# Patient Record
Sex: FEMALE | Race: BLACK OR AFRICAN AMERICAN | Employment: FULL TIME | URBAN - METROPOLITAN AREA
[De-identification: names, ages, dates, MRNs, and addresses within clinical notes are randomized per-mention and may not be internally consistent; named-entity substitution may affect disease eponyms.]

---

## 2021-04-22 ENCOUNTER — OFFICE VISIT (OUTPATIENT)
Dept: ORTHOPEDIC SURGERY | Age: 26
End: 2021-04-22
Payer: OTHER GOVERNMENT

## 2021-04-22 VITALS
TEMPERATURE: 97.8 F | WEIGHT: 138.2 LBS | HEART RATE: 88 BPM | OXYGEN SATURATION: 100 % | HEIGHT: 62 IN | BODY MASS INDEX: 25.43 KG/M2

## 2021-04-22 DIAGNOSIS — M41.125 ADOLESCENT IDIOPATHIC SCOLIOSIS OF THORACOLUMBAR REGION: Primary | ICD-10-CM

## 2021-04-22 DIAGNOSIS — M41.125 ADOLESCENT IDIOPATHIC SCOLIOSIS OF THORACOLUMBAR REGION: ICD-10-CM

## 2021-04-22 DIAGNOSIS — G89.29 CHRONIC BILATERAL LOW BACK PAIN WITHOUT SCIATICA: ICD-10-CM

## 2021-04-22 DIAGNOSIS — M54.50 CHRONIC BILATERAL LOW BACK PAIN WITHOUT SCIATICA: ICD-10-CM

## 2021-04-22 PROCEDURE — 99203 OFFICE O/P NEW LOW 30 MIN: CPT | Performed by: PHYSICAL MEDICINE & REHABILITATION

## 2021-04-22 NOTE — PROGRESS NOTES
[address]  [address], [address]  [address]  Phone: [phone]  Fax: [phone]      Patient: Katelyn Dale                                                                              MRN: [id number]        YOB: 1995          AGE: 23 y.o. PCP: Jairo Stearns MD  Date:  04/22/21    Reason for Consultation: Back Pain      HPI:  Katelyn Dale is a 23 y.o. female with relevant PMH of scoliosis dx at age 9, tried bracing and then ultimately had surgery (?[place] iqra surgery) at age 16 in [facility], post surgery was active ran cross country and did gymanstic. With her first pregnancy about 1.5  years ago she had right sided low back pain radiating into her right leg that improved once she delivered. She became pregnant with her second son shortly after and during this pregnancy had severe low back pain and right thoracic/scapular pain. She also noticed numbness in her right scapula. After delivery 3 months ago the numbness improved but she continues to have constant low back pain and intermittent right thoracic pain. Neurologic symptoms: No numbness, tingling, weakness, bowel or bladder changes. No recent falls      Location: The pain is located in the constant low back, intermitent right upper and scapular pain. Radiation: The pain does not radiate   Pain Score: Currently: 4/10  At Best: 3/10  At Worst: 10/10   Quality: Pain is of a Achy quality. Aggravating: Pain is exacerbated by bending forward  Alleviating:  The pain is alleviated by heat    Prior Treatments:   Physical therapy- 2013  Previous Medications:  Lidocaine patch  Current Medications:  Previous work-up has included:   No x-rays available today      Past Medical History:   Past Medical History:   Diagnosis Date    Eczema     Scoliosis       Past Surgical History:   Past Surgical History:   Procedure Laterality Date    HX WISDOM TEETH EXTRACTION      [place] SPINE SURGERY PROCEDURE UNLISTED      for scoliosis      SocHx:   Social History     Tobacco Use    Smoking status: Current Every Day Smoker     Types: Cigarettes    Smokeless tobacco: Never Used   Substance Use Topics    Alcohol use: Yes     Comment: socially      FamHx:? No family history on file. Current Medications: Allergies: Allergies   Allergen Reactions    Banana Other (comments)     Stomach ache    Peanut Butter Flavor Itching and Swelling        Review of Systems:   Gen:    Denied fevers, chills, malaise, fatigue, weight changes   Resp: Denied shortness of breath, cough, wheezing   CVS: Denied chest pain, palpitations   : Denied urinary urgency, frequency, incontinence   GI: Denied nausea, vomiting, constipation, diarrhea   Skin: Denied rashes, wounds   Psych: Denied anxiety, depression   Vasc: Denied claudication, ulcers   Hem: Denied easy bruising/bleeding   MSK: See HPI   Neuro: See HPI         Physical Exam     Vital Signs:   Visit Vitals  Pulse 88   Temp 97.8 °F (36.6 °C) (Tympanic)   Ht 5' 2\" (1.575 m)   Wt 138 lb 3.2 oz (62.7 kg)   SpO2 100%   BMI 25.28 kg/m²      General: ??????? Well nourished and well developed female without any acute distress   Psychiatric: ?  Alert and oriented x 3 with normal mood    HEENT: ???????? Atraumatic   Respiratory:   Breathing non-labored and non dyspneic   CV: ???????????????? Peripheral pulses intact, no peripheral edema   Skin: ????????????? No rashes + scar along thoracic and lumbar spineT4-L3      Neurologic: ?? Sensation: normal and grossly intact thebilateral, upper extremity(s), lower extremity(s)   Strength: 5/5 in the bilateral, upper extremity(s), lower extremity(s)  Reflexes: reveals 2+ symmetric DTRs throughout UE   Gait: normal   Upper tract signs: Sin's negative ?      Musculoskeletal: Lumbar Exam     Inspection:   Alignment: Abnormal + right thoracic prominence  Atrophy: None   Single leg stance: Abnormal increased pelvic tilt when standing right leg      Tenderness to Palpation:   Lumbar paraspinals Positive R>L  Lumbar spinous processes Negative  SI Joint:  Positive right  Gluteal:Negative   Greater trochanter: Negative   Tenderness along the right thoracic paraspinals and medial scapula,    ROM:   Lumbar ROM: Abnormal decreased lumbar motion with flexion  Lumbar facet loading: Positive  Hip ROM: No reproduction of pain with movement     Special Tests      Slump test: Negative  SLR: Negative  CAMILLA: Negative  FADIR: Negative       Assessment:   1. Thoracolumbar scoliosis s/p surgery at age 16- T4-L4? 2. Lumbar pain below iqra surgery  3. ? Right Thoracic neuritis  4.? Lumbar radiculitis with weakness in RLE    Plan:      -Physical therapy -  To work on core strengthening, strengthening   -Medications - motrin prn. Counseled regarding side effects and appropriate administration of medications.    -Diagnostics/Imaging -scoliosis series, lumbar spine x-ray to evaluate degenerative changes distal to surgery  -Injections NA . Consider lumbar AMBREEN or facet injections, possible scapulothoracic bursa injection    -Lifestyle - continue regular aerobic activity and maintain healthy weight  -Education - The patient's diagnosis, prognosis and treatment options were discussed today. All questions were answered. F/U - in 6 week(s) or sooner if needed. Consider further imaging lumbar spine. Will review x-rays look at degenerative changes lower lumbar spine.          [address] [facility]

## 2021-04-28 ENCOUNTER — HOSPITAL ENCOUNTER (OUTPATIENT)
Dept: PHYSICAL THERAPY | Age: 26
Discharge: HOME | End: 2021-04-28
Payer: OTHER GOVERNMENT

## 2021-04-28 PROCEDURE — 97162 PT EVAL MOD COMPLEX 30 MIN: CPT

## 2021-04-28 PROCEDURE — 97530 THERAPEUTIC ACTIVITIES: CPT

## 2021-04-28 NOTE — PROGRESS NOTES
PT DAILY TREATMENT NOTE    Patient Name: Francois Chawla  Date:2021  : 1995  [x]  Patient  Verified  Payor:  / Plan: Severiano Pond / Product Type:  /    In time: 9:45 am  Out time:10:25 am  Total Treatment Time (min): 40  Total Timed Codes (min): 15  1:1 Treatment Time ( [address] only): 40   Visit #: 1 of 12    Treatment Area: Low back pain [M54.5]    SUBJECTIVE  Pain Level (0-10 scale): 3  Any medication changes, allergies to medications, adverse drug reactions, diagnosis change, or new procedure performed?: [x] No    [] Yes (see summary sheet for update)  Subjective functional status/changes:   [] No changes reported    Hx Present Illness: C/C of LBP, \"my lower back feels like it is about to break. 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   Denies numbness and tingling, no radicular sx or bowel and bladder involvement   Per pt MD feels has weakness on right side so has imaging pending   Had severe childhood scoliosis - rid placed in 2009 - Thoracic spine (level unknown) to S1  Increase in pain with bending, lifting children or heavy objects, can disturb sleep (1-2 times per week)  Relieving: heat  Just stopped breast feeding      Pain:  _10__/10 max       __3_/10 min     _3___/10 now    Location: Reports pain across low back, up right side to right axilla       [] Sharp    [] Dull      [] Burning     [x]  Aching     [] Throbbing      [] Tingling     [x] Other: Stiff        [x]  Constant                   [] Intermittent        Previous treatment:   PT in high school for back pain  Had PT during second pregnancy for pelvic pain     PMHX: PMHx/Surgical Hx:  significant childhood scoliosis - with Pilo placement     Social/Recreation/Work: Work Hx: in school for ultrasound tech, 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 \"    Cognition: A & O x 4      OBJECTIVE    25 min [x]Eval                  []Re-Eval             With   [] TE   [x] TA - 15 min    [] neuro   [] other: Patient Education: [x] Review HEP    [] Progressed/Changed HEP based on:   [] positioning   [] body mechanics   [] transfers   [] heat/ice application    [x] other: pt education regarding exam findings, anatomy involved, POC, pelvic floor function, discussed iqra placed     Other Objective/Functional Measures: Movement/gait:  Left shoulder lower, increased lateral foot strike, increased pelvic drop decreased arm swing     Visual Inspection: Thoracic: flattened   Lumbar: flattened   Shoulder/Scapula: right shoulder and scap higher, bilateral scap adducted   Incisions: well healed incision from T2-3 to L5    Palpation: TTP at bilateral posterior hip Left > Right                            AROM/PROM Right Left   Standing Flexion: to floor     Extension: dcr less than 25%     Trunk Rot 17 in p! 16.5 in         Hip IR 20 24   ER 18 16     Strength Right Left   Hip Flex 4 4   Knee Ext 5 5   Hamstrings  4- 4-   Hip ABD 3- 3+   DF WFL WFL   PF WFL WFL   Bridging: pain with bridging on right side, decreased with PPT    Special Tests Right Left   Slump - -   SLR - -   Passive SLR 90 90   Hip Add Drop - +   Gaenslen's - -   CAMILLA  - -   Had hip pain bilaterally with Gaenslen's    Reflexes Right Left   L4 2+ 2+   S1 2+ 2+                             Other Comments: Standing Forward Flexion fingers to floor, n reversal of lordosis (iqra), pain with standing, right rib hump  Gillet: hypomobile and dysrhythmia bilaterally     Pain Level (0-10 scale) post treatment: 3    ASSESSMENT/Changes in Function:   Pt is a 23 y.o. female with C/C of LBP, but denies radicular sx at this time.  Pt denies red flags and bowel and bladder involvement. Pt has pronounced hx of childhood scoliosis with iqra placed from ~T2-S1 in 2009. Pt has hx of childhood back pain. In most recent instance pain onset with pregnancy of 1rst child and has progressively increased since pregnancy of second and birth 3 months ago. At present signs/symptoms consistent with mechanical LBP and lumbopelvic dysfunction. Objective findings include noted postural adaptations, gait dysfunctions. Poor trunk stability with movements, pain with bridging, decreased trunk and glut strength and glut and hamstring inhibition as well as soft tissue restrictions. Patient will continue to benefit from skilled PT services to modify and progress therapeutic interventions, address functional mobility deficits, address ROM deficits, address strength deficits, analyze and address soft tissue restrictions, analyze and cue movement patterns, analyze and modify body mechanics/ergonomics, assess and modify postural abnormalities and instruct in home and community integration to attain remaining goals. [x]  See Plan of Care  []  See progress note/recertification  []  See Discharge Summary         Progress towards goals / Updated goals:  Short Term Goals: STG- To be accomplished in 5 treatment(s):  1. Pt will be independent with HEP to encourage prophylaxis. Eval:dispensed, to be updated as pt tolerates     Long Term Goals: LTG- To be accomplished in 12 treatments (s):  1. Pt will demonstrate ability bridge >10 times to full height without pain to indicate functional glut and hamstring strength. Eval:Bridging: pain with bridging on right side        2. Pt will be able to forward flex and pick object up off of ground without pain to increase tolerance to daily activities. Eval: Standing Forward Flexion fingers to floor, no reversal of lordosis (iqra), pain with standing, right rib hump    3.   Pt will improve trunk rotation to 15 in in hooklying without pain to indicate mobility needed for gait   Eval:Right: 17.5 in p!, Left: 16.5 in    4. Pt FOTO score will increase by 10 points to show improvement in function. Eval:60  Current: will address at visit 5    5. Pt will be able to lift children without pain to allow pt to perform daily activities.    Eval: pain with lifting and holding child (30 pounds.)        PLAN  [x]  Upgrade activities as tolerated     []  Continue plan of care  []  Update interventions per flow sheet       []  Discharge due to:_  []  Other:_      Linda Slater, PT, DPT 4/28/2021  9:49 AM    Future Appointments   Date Time Provider Imelda Gonzalez   6/17/2021  9:15 AM Cullen Rogel MD [facility]

## 2021-04-28 NOTE — PROGRESS NOTES
[facility] at the [address], [place], [address] [facility]  Phone: [phone]      Fax:  [phone]       Plan of Care/ Statement of Necessity for Physical Therapy Services      Patient name: Shaye Valdez Start of Care: 2021   Referral source: Rip Arabia* : 1995    Medical Diagnosis: Low back pain [M54.5]   Onset Date:2 years ago    Treatment Diagnosis: LBP   Prior Hospitalization: see medical history Provider#: [id number]   Medications: Verified on Patient summary List    Comorbidities: Severe Childhood scoliosis, Pilo placement T2-L5, hx of LBP, allergies    Prior Level of Function: Increase in pain with bending, lifting children or heavy objects, can disturb sleep (1-2 times per week)      The Plan of Care and following information is based on the information from the initial evaluation. Assessment/ key information:   Pt is a 23 y.o. female with C/C of LBP, but denies radicular sx at this time. Pt denies red flags and bowel and bladder involvement. Pt has pronounced hx of childhood scoliosis with pilo placed from ~T2-S1 in . Pt has hx of childhood back pain. In most recent instance pain onset with pregnancy of 1rst child and has progressively increased since pregnancy of second and birth 3 months ago. At present signs/symptoms consistent with mechanical LBP and lumbopelvic dysfunction. Objective findings include noted postural adaptations, gait dysfunctions. Poor trunk stability with movements, pain with bridging, decreased trunk and glut strength and glut and hamstring inhibition as well as soft tissue restrictions.      Evaluation Complexity History HIGH Complexity :3+ comorbidities / personal factors will impact the outcome/ POC ; Examination HIGH Complexity : 4+ Standardized tests and measures addressing body structure, function, activity limitation and / or participation in recreation  ;Presentation MEDIUM Complexity : Evolving with changing characteristics  ; Clinical Decision Making MEDIUM Complexity : FOTO score of 26-74  Overall Complexity Rating: MEDIUM  Problem List: pain affecting function, decrease ROM, decrease strength, impaired gait/ balance, decrease ADL/ functional abilitiies, decrease activity tolerance and decrease flexibility/ joint mobility   Treatment Plan may include any combination of the following: Therapeutic exercise, Therapeutic activities, Neuromuscular re-education, Physical agent/modality, Gait/balance training, Manual therapy and Patient education  Patient / Family readiness to learn indicated by: asking questions, trying to perform skills and interest  Persons(s) to be included in education: patient (P)  Barriers to Learning/Limitations: None  Patient Goal (s): Strengthen my right side and pain relief.   Patient Self Reported Health Status: good  Rehabilitation Potential: good    Short Term Goals: STG- To be accomplished in 5 treatment(s):  1. Pt will be independent with HEP to encourage prophylaxis. Eval:dispensed, to be updated as pt tolerates      Long Term Goals: LTG- To be accomplished in 12 treatments (s):  1. Pt will demonstrate ability bridge >10 times to full height without pain to indicate functional glut and hamstring strength. Eval:Bridging: pain with bridging on right side         2. Pt will be able to forward flex and pick object up off of ground without pain to increase tolerance to daily activities. Eval: Standing Forward Flexion fingers to floor, no reversal of lordosis (iqra), pain with standing, right rib hump     3. Pt will improve trunk rotation to 15 in in hooklying without pain to indicate mobility needed for gait   Eval:Right: 17.5 in p!, Left: 16.5 in     4. Pt FOTO score will increase by 10 points to show improvement in function. Eval:60  Current: will address at visit 5     5. Pt will be able to lift children without pain to allow pt to perform daily activities.    Eval: pain with lifting and holding child (30 pounds.)     Frequency / Duration: Patient to be seen 12 treatments. Patient/ Caregiver education and instruction: Diagnosis, prognosis, self care, activity modification and exercises   [x]  Plan of care has been reviewed with ELENI Armstrong PT, DPT 4/28/2021 3:16 PM  _____________________________________________________________________  I certify that the above Therapy Services are being furnished while the patient is under my care. I agree with the treatment plan and certify that this therapy is necessary.     Physician's Signature:____________Date:_________TIME:________                                      Kvng Willett*    ** Signature, Date and Time must be completed for valid certification **    Please sign and return to [facility] at the [address], [facility], [address] [facility]       Phone: [phone]      Fax:  [phone]

## 2021-05-04 ENCOUNTER — APPOINTMENT (OUTPATIENT)
Dept: PHYSICAL THERAPY | Age: 26
End: 2021-05-04
Payer: OTHER GOVERNMENT

## 2021-05-12 ENCOUNTER — HOSPITAL ENCOUNTER (OUTPATIENT)
Dept: PHYSICAL THERAPY | Age: 26
Discharge: HOME | End: 2021-05-12
Payer: OTHER GOVERNMENT

## 2021-05-12 PROCEDURE — 97530 THERAPEUTIC ACTIVITIES: CPT

## 2021-05-12 PROCEDURE — 97110 THERAPEUTIC EXERCISES: CPT

## 2021-05-12 NOTE — PROGRESS NOTES
PT DAILY TREATMENT NOTE    Patient Name: Adelia Sosa  Date:2021  : 1995  [x]  Patient  Verified  Payor:  / Plan: Jose Saucedo 74 / Product Type:  /    In time:8:47  Out time:9:35  Total Treatment Time (min): 48  Total Timed Codes (min): 48  1:1 Treatment Time ([address] only): 48   Visit #: 2 of 12    Treatment Area: Low back pain [M54.5]    SUBJECTIVE  Pain Level (0-10 scale): 2  Any medication changes, allergies to medications, adverse drug reactions, diagnosis change, or new procedure performed?: [x] No    [] Yes (see summary sheet for update)  Subjective functional status/changes:   [] No changes reported  \"I was really sore after the first visit and the exercises kind of hurt. \"    OBJECTIVE      37 min Therapeutic Exercise:  [x] See flow sheet :   Rationale: increase ROM and increase strength to improve the patients ability to perform daily activities with decreased pain and symptom levels    11 min Therapeutic Activity:  [x]  See flow sheet :   Rationale: increase strength, improve coordination and increase proprioception  to improve the patients ability to perform daily activities with decreased pain and symptom levels           With   [] TE   [] TA   [] neuro   [] other: Patient Education: [x] Review HEP    [] Progressed/Changed HEP based on:   [] positioning   [] body mechanics   [] transfers   [] heat/ice application    [] other:      Other Objective/Functional Measures:   Decreased PPT in QP  Fatigue with SB deadbugs     Pain Level (0-10 scale) post treatment: 2    ASSESSMENT/Changes in Function: Pt tolerated session well with reporting decreased pain post session. Pt challenged with PPT needing tactile cues for assist initially however able to carryover. Pt able to complete 10 bridges with PPT today without right sided low back pain demonstrated great carryover from session.      Patient will continue to benefit from skilled PT services to modify and progress therapeutic interventions, address functional mobility deficits, address ROM deficits, address strength deficits, analyze and cue movement patterns, analyze and modify body mechanics/ergonomics, assess and modify postural abnormalities and instruct in home and community integration to attain remaining goals. []  See Plan of Care  []  See progress note/recertification  []  See Discharge Summary         Progress towards goals / Updated goals:  Short Term Goals: STG- To be accomplished in 5 treatment(s):  1.  Pt will be independent with HEP to encourage prophylaxis. Eval:dispensed, to be updated as pt tolerates  Current: compliance however reporting increased pain with completing progressing 5/12/21      Long Term Goals: LTG- To be accomplished in 12 treatments (s):  1.  Pt will demonstrate ability bridge >10 times to full height without pain to indicate functional glut and hamstring strength. Eval:Bridging: pain with bridging on right side    Current: able to complete 10 with PPT without pain and good height progressing well 5/12/21     2.  Pt will be able to forward flex and pick object up off of ground without pain to increase tolerance to daily activities. Eval: Standing Forward Flexion fingers to floor, no reversal of lordosis (iqra), pain with standing, right rib hump     3.  Pt will improve trunk rotation to 15 in in hooklying without pain to indicate mobility needed for gait   Eval:Right: 17.5 in p!, Left: 16.5 in     4.  Pt FOTO score will increase by 10 points to show improvement in function. Eval:60  Current: will address at visit 5     5. Pt will be able to lift children without pain to allow pt to perform daily activities.    Eval: pain with lifting and holding child (30 pounds.)       PLAN  []  Upgrade activities as tolerated     [x]  Continue plan of care  []  Update interventions per flow sheet       []  Discharge due to:_  []  Other:_      Marilou Penaloza 5/12/2021  9:16 AM    Future Appointments   Date Time Provider Imelda Gonzalez   6/17/2021  9:15 AM Brandon Covington MD [facility]

## 2021-05-13 ENCOUNTER — APPOINTMENT (OUTPATIENT)
Dept: PHYSICAL THERAPY | Age: 26
End: 2021-05-13
Payer: OTHER GOVERNMENT

## 2021-05-18 ENCOUNTER — HOSPITAL ENCOUNTER (OUTPATIENT)
Dept: PHYSICAL THERAPY | Age: 26
Discharge: HOME | End: 2021-05-18
Payer: OTHER GOVERNMENT

## 2021-05-18 PROCEDURE — 97110 THERAPEUTIC EXERCISES: CPT

## 2021-05-18 PROCEDURE — 97112 NEUROMUSCULAR REEDUCATION: CPT

## 2021-05-18 NOTE — PROGRESS NOTES
PT DAILY TREATMENT NOTE    Patient Name: Mary Anne Herrera  Date:2021  : 1995  [x]  Patient  Verified  Payor:  / Plan: [facility] / Product Type:  /    In time:1105  Out time:1150  Total Treatment Time (min): 45  Total Timed Codes (min): 45  1:1 Treatment Time ([facility]BS only): 39    Visit #: 3 of 12    Treatment Area: Low back pain [M54.5]    SUBJECTIVE  Pain Level (0-10 scale): 1 LB right >left  Any medication changes, allergies to medications, adverse drug reactions, diagnosis change, or new procedure performed?: [x] No    [] Yes (see summary sheet for update)  Subjective functional status/changes:   [] No changes reported  \"My back has been so much worse after having my 2 kids.  It feels like it wants to break\"    OBJECTIVE          25 min Therapeutic Exercise:  [x] See flow sheet :discussed benefit from wall PPT/reach to minimize pain during ADL   Rationale: increase ROM, increase strength and improve coordination to improve the patients ability to perform ADL without pain       20 min Neuromuscular Re-education:  [x]  See flow sheet :   Rationale: improve coordination, increase proprioception and repositioning with focus on ES inhibition  to improve the patients ability to perform ADL with more ease              With   [x] TE   [] TA   [] neuro   [] other: Patient Education: [x] Review HEP    [] Progressed/Changed HEP based on:   [] positioning   [] body mechanics   [] transfers   [] heat/ice application    [] other:      Other Objective/Functional Measures:   -patient challenged with balloon breathing , shallow exhalation  -Trunk rotation post session  Right 13 in ,left 13.5 in with right LBP     Pain Level (0-10 scale) post treatment: 0    ASSESSMENT/Changes in Function: mild fluctuating pain across LB, no pain with 90/90 position    Patient will continue to benefit from skilled PT services to address strength deficits, analyze and address soft tissue restrictions, analyze and cue movement patterns and assess and modify postural abnormalities to attain remaining goals. [x]  See Plan of Care        Progress towards goals / Updated goals:  Short Term Goals: STG- To be accomplished in 5 treatment(s):  1.  Pt will be independent with HEP to encourage prophylaxis. Eval:dispensed, to be updated as pt tolerates  Current: compliance however reporting increased pain with completing progressing 5/12/21       Long Term Goals: LTG- To be accomplished in 12 treatments (s):  1.  Pt will demonstrate ability bridge >10 times to full height without pain to indicate functional glut and hamstring strength. Eval:Bridging: pain with bridging on right side    Current: able to complete 10 with PPT without pain and good height progressing well 5/12/21     2.  Pt will be able to forward flex and pick object up off of ground without pain to increase tolerance to daily activities. Eval: Standing Forward Flexion fingers to floor, no reversal of lordosis (irqa), pain with standing, right rib hump     3.  Pt will improve trunk rotation to 15 in in hooklying without pain to indicate mobility needed for gait   Eval:Right: 17.5 in p!, Left: 16.5 in  Status on 5/18/21: trunk rotation Right 13 in ,left 13.5 in with right LBP, progressing     4.  Pt FOTO score will increase by 10 points to show improvement in function. Eval:60  Current: will address at visit 5     5. Pt will be able to lift children without pain to allow pt to perform daily activities.    Eval: pain with lifting and holding child (30 pounds.)       PLAN  []  Upgrade activities as tolerated     [x]  Continue plan of care  []  Update interventions per flow sheet       []  Discharge due to:_  []  Other:_      Phillip Falcon, PT 5/18/2021  11:12 AM    Future Appointments   Date Time Provider Imelda Gonzalez   5/20/2021  2:30 PM Eric Baeza, PT [facility]   5/26/2021 12:30 PM Edna Phalen, PT [facility]   5/27/2021  8:00 AM Berenice Wrae, PT [facility]   6/4/2021 10:15 AM Rinku Schuster PT, DPT [facility]   6/17/2021  9:15 AM Yvonne Matias MD [facility]

## 2021-05-20 ENCOUNTER — APPOINTMENT (OUTPATIENT)
Dept: PHYSICAL THERAPY | Age: 26
End: 2021-05-20
Payer: OTHER GOVERNMENT

## 2021-05-27 ENCOUNTER — HOSPITAL ENCOUNTER (OUTPATIENT)
Dept: PHYSICAL THERAPY | Age: 26
Discharge: HOME | End: 2021-05-27
Payer: OTHER GOVERNMENT

## 2021-05-27 NOTE — PROGRESS NOTES
PT DAILY TREATMENT NOTE    Patient Name: Angelia Rosales  Date:2021  : 1995  [x]  Patient  Verified  Payor:  / Plan: [facility] / Product Type:  /    In time:810  Out time:900  Total Treatment Time (min): 50  Total Timed Codes (min): 40  1:1 Treatment Time ([facility] only): 40    Visit #: 4 of 12    Treatment Area: Low back pain [M54.5]    SUBJECTIVE  Pain Level (0-10 scale): 5  Any medication changes, allergies to medications, adverse drug reactions, diagnosis change, or new procedure performed?: [x] No    [] Yes (see summary sheet for update)  Subjective functional status/changes:   [] No changes reported  Reports missing yesterday's appointment and late this morning due to kids being sick, reports significant tightness and LBP this morning, had to sit down to put on shorts    OBJECTIVE  Modalities Rationale:     decrease pain and increase tissue extensibility to improve patient's ability to perform ADL with more ease   min [] Estim, type/location:                                      []  att     []  unatt     []  w/US     []  w/ice    []  w/heat    min []  Mechanical Traction: type/lbs                   []  pro   []  sup   []  int   []  cont    []  before manual    []  after manual    min []  Ultrasound, settings/location:      min []  Iontophoresis w/ dexamethasone, location:                                               []  take home patch       []  in clinic   10 min []  Ice     [x]  Heat    location/position: LB in 90/90    min []  Vasopneumatic Device, press/temp:     min []  Other:    [] Skin assessment post-treatment (if applicable):    []  intact    []  redness- no adverse reaction     []redness - adverse reaction:            10 min Therapeutic Exercise:  [x] See flow sheet :   Rationale: increase ROM, increase strength and improve coordination to improve the patients ability to perform ADL with more ease       20 min Neuromuscular Re-education:  [x]  See flow sheet :   Rationale: improve coordination and increase proprioception  to improve the patients ability to perform ADL without ES overuse    10 min Manual Therapy:  Functional massage LS in prone on pillow   Rationale: decrease pain, increase ROM and increase tissue extensibility to reduce LS tension              With   [x] TE   [] TA   [] neuro   [] other: Patient Education: [x] Review HEP    [] Progressed/Changed HEP based on:   [] positioning   [] body mechanics   [] transfers   [] heat/ice application    [] other:      Other Objective/Functional Measures: slight reversal of LS lordosis     Pain Level (0-10 scale) post treatment: 2    ASSESSMENT/Changes in Function: good tolerance to activities, occasional VC needed to minimize ES use    Patient will continue to benefit from skilled PT services to address strength deficits, analyze and address soft tissue restrictions, analyze and cue movement patterns and assess and modify postural abnormalities to attain remaining goals. [x]  See Plan of Care    Progress towards goals / Updated goals:  Short Term Goals: STG- To be accomplished in 5 treatment(s):  1.  Pt will be independent with HEP to encourage prophylaxis. Eval:dispensed, to be updated as pt tolerates  Status on 5/27/21:compliant, reports mostly relief with current HEP, progressing        Long Term Goals: LTG- To be accomplished in 12 treatments (s):  1.  Pt will demonstrate ability bridge >10 times to full height without pain to indicate functional glut and hamstring strength. Eval:Bridging: pain with bridging on right side    Current: able to complete 10 with PPT without pain and good height progressing well 5/12/21     2.  Pt will be able to forward flex and pick object up off of ground without pain to increase tolerance to daily activities.   Eval: Standing Forward Flexion fingers to floor, no reversal of lordosis (iqra), pain with standing, right rib hump  Status on 5/27/21: slight reversal of LS lordosis with floor reach , progressing     3.  Pt will improve trunk rotation to 15 in in hooklying without pain to indicate mobility needed for gait   Eval:Right: 17.5 in p!, Left: 16.5 in  Status on 5/18/21: trunk rotation Right 13 in ,left 13.5 in with right LBP, progressing     4.  Pt FOTO score will increase by 10 points to show improvement in function. Eval:60  Current: will address at visit 5     5. Pt will be able to lift children without pain to allow pt to perform daily activities.    Eval: pain with lifting and holding child (30 pounds.)       PLAN  [x]  Upgrade activities as tolerated     [x]  Continue plan of care      Ned Wall, PT 5/27/2021  8:12 AM    Future Appointments   Date Time Provider Imelda Gonzalez   6/4/2021 10:15 AM Tala Pal [facility]   6/17/2021  9:15 AM Keely Calvert MD [facility]

## 2021-06-04 ENCOUNTER — HOSPITAL ENCOUNTER (OUTPATIENT)
Dept: PHYSICAL THERAPY | Age: 26
Discharge: HOME | End: 2021-06-04
Payer: OTHER GOVERNMENT

## 2021-06-04 ENCOUNTER — HOSPITAL ENCOUNTER (OUTPATIENT)
Dept: GENERAL RADIOLOGY | Age: 26
Discharge: HOME | End: 2021-06-04
Payer: OTHER GOVERNMENT

## 2021-06-04 PROCEDURE — 72082 X-RAY EXAM ENTIRE SPI 2/3 VW: CPT

## 2021-06-04 PROCEDURE — 72100 X-RAY EXAM L-S SPINE 2/3 VWS: CPT

## 2021-06-04 PROCEDURE — 97140 MANUAL THERAPY 1/> REGIONS: CPT

## 2021-06-04 PROCEDURE — 97110 THERAPEUTIC EXERCISES: CPT

## 2021-06-04 NOTE — PROGRESS NOTES
PT DAILY TREATMENT NOTE    Patient Name: Sandy Greer  Date:2021  : 1995  [x]  Patient  Verified  Payor:  / Plan: [facility] / Product Type:  /    In time:10:25  Out time:11:10  Total Treatment Time (min): 45  Total Timed Codes (min): 37  1:1 Treatment Time ( [address] only): 37   Visit #: 5 of 12    Treatment Area: Low back pain [M54.5]    SUBJECTIVE  Pain Level (0-10 scale): 6  Any medication changes, allergies to medications, adverse drug reactions, diagnosis change, or new procedure performed?: [x] No    [] Yes (see summary sheet for update)  Subjective functional status/changes:   [] No changes reported  \"Im in a lot more pain. IT feels like its burning down my right side. It hurts to  my son. \"     OBJECTIVE    Modalities Rationale:     decrease edema, decrease inflammation and decrease pain to improve patient's ability to [perform pain free ADL's    min [] Estim, type/location:                                      []  att     []  unatt     []  w/US     []  w/ice    []  w/heat    min []  Mechanical Traction: type/lbs                   []  pro   []  sup   []  int   []  cont    []  before manual    []  after manual    min []  Ultrasound, settings/location:      min []  Iontophoresis w/ dexamethasone, location:                                               []  take home patch       []  in clinic   10 min []  Ice     [x]  Heat    location/position:  To low back in supine with LE elevation     min []  Vasopneumatic Device, press/temp:     min []  Other:    [] Skin assessment post-treatment (if applicable):    []  intact    []  redness- no adverse reaction     []redness - adverse reaction:          22 min Therapeutic Exercise:  [] See flow sheet :   Rationale: increase ROM, increase strength and improve coordination to improve the patients ability to perform pain free ADL's        15 min Manual Therapy:  Functional STM to right lumbar paraspinals and manual right pelvic depression with QL stretch   Cupping to right paraspinals   In left sidelying over bolster with right leg supported    Rationale: decrease pain, increase ROM, increase tissue extensibility, decrease edema , decrease trigger points and increase postural awareness to improve the patients ability to perform pain free ADL's   The manual therapy interventions were performed at a separate and distinct time from the therapeutic activities interventions. With   [] TE   [] TA   [] neuro   [] other: Patient Education: [x] Review HEP    [] Progressed/Changed HEP based on:   [] positioning   [] body mechanics   [] transfers   [] heat/ice application    [] other:      Other Objective/Functional Measures:     Good PPT at wall        Pain Level (0-10 scale) post treatment: 2    ASSESSMENT/Changes in Function:   Pt presented in therapy with c/o increased LBP that produced a \"burning\" feeling into right side and right leg. reported pain increases with lifting son. Pt demonstrated good PPT at wall. Pt appears to be challenged with abdominal exercises and very weak obliques. Patient will continue to benefit from skilled PT services to modify and progress therapeutic interventions, address functional mobility deficits, address ROM deficits, address strength deficits, analyze and address soft tissue restrictions, analyze and cue movement patterns, analyze and modify body mechanics/ergonomics, assess and modify postural abnormalities, address imbalance/dizziness and instruct in home and community integration to attain remaining goals. []  See Plan of Care  []  See progress note/recertification  []  See Discharge Summary         Progress towards goals / Updated goals:  Short Term Goals: STG- To be accomplished in 5 treatment(s):  1.  Pt will be independent with HEP to encourage prophylaxis.   Eval:dispensed, to be updated as pt tolerates  Status on 5/27/21:compliant, reports mostly relief with current HEP, progressing        Long Term Goals: LTG- To be accomplished in 12 treatments (s):  1.  Pt will demonstrate ability bridge >10 times to full height without pain to indicate functional glut and hamstring strength. Eval:Bridging: pain with bridging on right side    Current: able to complete 10 with PPT without pain and good height. Pt demonstrated good PPT at wall:  progressing well 6/4/21     2.  Pt will be able to forward flex and pick object up off of ground without pain to increase tolerance to daily activities. Eval: Standing Forward Flexion fingers to floor, no reversal of lordosis (iqra), pain with standing, right rib hump  Status on 5/27/21: slight reversal of LS lordosis with floor reach , progressing     3.  Pt will improve trunk rotation to 15 in in hooklying without pain to indicate mobility needed for gait   Eval:Right: 17.5 in p!, Left: 16.5 in  Status on 5/18/21: trunk rotation Right 13 in ,left 13.5 in with right LBP, progressing     4.  Pt FOTO score will increase by 10 points to show improvement in function. Eval:60  Current: Pt was unable to assess FOTO, du to increased pain and time constraints . Plan to assess NPV : 6/4/21      5. Pt will be able to lift children without pain to allow pt to perform daily activities.    Eval: pain with lifting and holding child (30 pounds.)   current: pt reported increased pain in right side and burning that worsens when lifting son: sloe progress 6/4/21    PLAN  []  Upgrade activities as tolerated     [x]  Continue plan of care  []  Update interventions per flow sheet       []  Discharge due to:_  []  Other:_      Loulou Ramirez PTA 6/4/2021  10:24 AM    Future Appointments   Date Time Provider Imelda Gonzalez   6/17/2021  9:15 AM Brandie Dey MD [facility]

## 2021-06-11 ENCOUNTER — HOSPITAL ENCOUNTER (OUTPATIENT)
Dept: PHYSICAL THERAPY | Age: 26
Discharge: HOME | End: 2021-06-11
Payer: OTHER GOVERNMENT

## 2021-06-11 PROCEDURE — 97110 THERAPEUTIC EXERCISES: CPT

## 2021-06-11 PROCEDURE — 97112 NEUROMUSCULAR REEDUCATION: CPT

## 2021-06-11 PROCEDURE — 97140 MANUAL THERAPY 1/> REGIONS: CPT

## 2021-06-11 NOTE — PROGRESS NOTES
PT DAILY TREATMENT NOTE    Patient Name: Hans Carvalho  Date:2021  : 1995  [x]  Patient  Verified  Payor:  / Plan: [facility] / Product Type:  /    In time:11:50  Out time:12:54  Total Treatment Time (min): 64  Total Timed Codes (min): 54  1:1 Treatment Time ( [address] only): 47   Visit #: 6 of 12    Treatment Area: Low back pain [M54.5]    SUBJECTIVE  Pain Level (0-10 scale): 5-6  Any medication changes, allergies to medications, adverse drug reactions, diagnosis change, or new procedure performed?: [x] No    [] Yes (see summary sheet for update)  Subjective functional status/changes:   [] No changes reported  \"Its the same . No change in pain really. Im waiting to go back to see my doctor on Tuesday. \"     OBJECTIVE    Modalities Rationale:     decrease edema, decrease inflammation and decrease pain to improve patient's ability to perform pain free ADL's    min [] Estim, type/location:                                      []  att     []  unatt     []  w/US     []  w/ice    []  w/heat    min []  Mechanical Traction: type/lbs                   []  pro   []  sup   []  int   []  cont    []  before manual    []  after manual    min []  Ultrasound, settings/location:      min []  Iontophoresis w/ dexamethasone, location:                                               []  take home patch       []  in clinic   10 min []  Ice     [x]  Heat    location/position:  To low back in supine     min []  Vasopneumatic Device, press/temp:     min []  Other:    [] Skin assessment post-treatment (if applicable):    []  intact    []  redness- no adverse reaction     []redness - adverse reaction:          34 min Therapeutic Exercise:  [] See flow sheet :   Rationale: increase ROM, increase strength, improve coordination and improve balance to improve the patients ability to perform pain free ADL's      10 min Neuromuscular Re-education:  []  See flow sheet :   Rationale: improve coordination, improve balance and increase proprioception  to improve the patients ability to perform pain free ADL's     10 min Manual Therapy:  Functional STM to bilateral  Lumbar and thoracic paraspinals with trigger point release to left posterior hip and glut    Right QL inhibition with manual right pelvic depression in left sidelying over bolster    Rationale: decrease pain, increase ROM, increase tissue extensibility, decrease trigger points and increase postural awareness to improve the patients ability to perform pain free ADL's   The manual therapy interventions were performed at a separate and distinct time from the therapeutic activities interventions. With   [x] TE   [] TA   [] neuro   [] other: Patient Education: [x] Review HEP    [] Progressed/Changed HEP based on:   [] positioning   [] body mechanics   [] transfers   [] heat/ice application    [] other:      Other Objective/Functional Measures:   Challenged with abdominal / oblique facilitation     FOTO 61    Pain Level (0-10 scale) post treatment: 5    ASSESSMENT/Changes in Function:   Pt presented in therapy with continued reports of pain. Tolerated all exercises well and did say that she feels better after therapy . Stated pain might have increased due to moving and doing a lot of lifting boxes. Pt was very challenged with dead bugs. May benefit for left oblique strengthening and left ADD facilitation in lady in glasses or standing left add pull in. Patient will continue to benefit from skilled PT services to modify and progress therapeutic interventions, address functional mobility deficits, address ROM deficits, address strength deficits, analyze and address soft tissue restrictions, analyze and cue movement patterns, analyze and modify body mechanics/ergonomics, assess and modify postural abnormalities, address imbalance/dizziness and instruct in home and community integration to attain remaining goals.      []  See Plan of Care  []  See progress note/recertification  []  See Discharge Summary         Progress towards goals / Updated goals:  Short Term Goals: STG- To be accomplished in 5 treatment(s):  1.  Pt will be independent with HEP to encourage prophylaxis. Eval:dispensed, to be updated as pt tolerates  Status on 5/27/21:compliant, reports mostly relief with current HEP, progressing        Long Term Goals: LTG- To be accomplished in 12 treatments (s):  1.  Pt will demonstrate ability bridge >10 times to full height without pain to indicate functional glut and hamstring strength. Eval:Bridging: pain with bridging on right side    Current: able to complete 10 with PPT without pain and good height. Pt demonstrated good PPT at wall, challenged with swiss ball bridge:  progressing well 6/11/21     2.  Pt will be able to forward flex and pick object up off of ground without pain to increase tolerance to daily activities. Eval: Standing Forward Flexion fingers to floor, no reversal of lordosis (iqra), pain with standing, right rib hump  Status on 5/27/21: slight reversal of LS lordosis with floor reach , progressing     3.  Pt will improve trunk rotation to 15 in in hooklying without pain to indicate mobility needed for gait   Eval:Right: 17.5 in p!, Left: 16.5 in  Status on 5/18/21: trunk rotation Right 13 in ,left 13.5 in with right LBP, progressing     4.  Pt FOTO score will increase by 10 points to show improvement in function. Eval:60  Current: 61/100 : minimal progress 6/11/21     5. Pt will be able to lift children without pain to allow pt to perform daily activities.    Eval: pain with lifting and holding child (30 pounds.)   current: pt reported increased pain in right side and burning that worsens when lifting son: sloe progress 6/4/21    PLAN  []  Upgrade activities as tolerated     []  Continue plan of care  []  Update interventions per flow sheet       []  Discharge due to:_  []  Other:_      Vonda Zabala, ELENI 6/11/2021  11:54 AM    Future Appointments   Date Time Provider Imelda Gonzalez   6/15/2021  9:30 AM Angelita Desai MD [facility]   6/15/2021  2:30 PM Chandrakant Gibbons PTA [facility]

## 2021-06-15 ENCOUNTER — OFFICE VISIT (OUTPATIENT)
Dept: ORTHOPEDIC SURGERY | Age: 26
End: 2021-06-15
Payer: OTHER GOVERNMENT

## 2021-06-15 ENCOUNTER — APPOINTMENT (OUTPATIENT)
Dept: PHYSICAL THERAPY | Age: 26
End: 2021-06-15
Payer: OTHER GOVERNMENT

## 2021-06-15 VITALS
HEIGHT: 62 IN | WEIGHT: 138 LBS | HEART RATE: 70 BPM | BODY MASS INDEX: 25.4 KG/M2 | OXYGEN SATURATION: 100 % | TEMPERATURE: 97.6 F

## 2021-06-15 DIAGNOSIS — G89.29 CHRONIC RIGHT-SIDED LOW BACK PAIN WITHOUT SCIATICA: ICD-10-CM

## 2021-06-15 DIAGNOSIS — M41.125 ADOLESCENT IDIOPATHIC SCOLIOSIS OF THORACOLUMBAR REGION: ICD-10-CM

## 2021-06-15 DIAGNOSIS — M54.50 CHRONIC RIGHT-SIDED LOW BACK PAIN WITHOUT SCIATICA: ICD-10-CM

## 2021-06-15 DIAGNOSIS — G89.29 CHRONIC RIGHT-SIDED LOW BACK PAIN WITHOUT SCIATICA: Primary | ICD-10-CM

## 2021-06-15 DIAGNOSIS — M54.50 CHRONIC RIGHT-SIDED LOW BACK PAIN WITHOUT SCIATICA: Primary | ICD-10-CM

## 2021-06-15 PROCEDURE — 99212 OFFICE O/P EST SF 10 MIN: CPT | Performed by: PHYSICAL MEDICINE & REHABILITATION

## 2021-06-15 NOTE — PROGRESS NOTES
Shaye Valdez presents today for   Chief Complaint   Patient presents with    Back Pain       Is someone accompanying this pt? no    Is the patient using any DME equipment during OV? no      Coordination of Care:  1. Have you been to the ER, urgent care clinic since your last visit? no  Hospitalized since your last visit? no    2. Have you seen or consulted any other health care providers outside of the [address] [place] [address] since your last visit? no Include any pap smears or colon screening.  no

## 2021-06-15 NOTE — PROGRESS NOTES
[address]  [address], [address]  [place], [address] [place] [address] Street  Phone: [phone]  Fax: [phone]      Patient: Jobie Gottron                                                                              MRN: [id number]        YOB: 1995          AGE: 23 y.o. PCP: Zee Herman MD  Date:  06/15/21    Reason for Consultation: Back Pain      HPI:  Jobie Gottron is a 23 y.o. female with relevant PMH of scoliosis dx at age 9, tried bracing and then ultimately had surgery T5-L2 (?[place] iqra surgery) at age 16 in [facility], post surgery was active ran cross country and did gymanstic. With her first pregnancy about 1.5  years ago she had right sided low back pain radiating into her right leg that improved once she delivered. She became pregnant with her second son shortly after and during this pregnancy had severe low back pain and right thoracic/scapular pain. She also noticed numbness in her right scapula. After delivery 3 months ago the numbness improved but she continues to have constant low back pain and intermittent right thoracic pain. She has tried physical therapy, temporary relief with massage but no lasting relief. Neurologic symptoms: No numbness, tingling, weakness, bowel or bladder changes. No recent falls      Location: The pain is located in the constant low back, intermitent right upper and scapular pain. Radiation: The pain does not radiate   Pain Score: Currently: 4/10  At Best: 3/10  At Worst: 10/10   Quality: Pain is of a Achy quality. Aggravating: Pain is exacerbated by bending forward  Alleviating:  The pain is alleviated by heat    Prior Treatments:   Physical therapy- 2013, currently in PT   Previous Medications:  Lidocaine patch  Current Medications: Aleve  Previous work-up has included:   No x-rays available today  XR Results (most recent):  Results from Orders Only encounter on 04/22/21    XR SPINE LUMB 2 OR 3 V    Narrative  Entire spine and Lumbar spine 2 views    HISTORY: Postop    COMPARISON: None    FINDINGS: Frontal and lateral imaging of the cervical, thoracic, and lumbar  spine. Additional frontal and lateral imaging of the lumbar spine also obtained. A total of 11 images    Spinal instrumentation spanning T5-L2. The hardware appears intact without  finding for hardware fracture. No suspicious periprosthetic lucencies. There is  a mild levoscoliosis of the lumbar spine apex at L1-L2. And there is a mild  dextroscoliosis apex at T7-T8. There is straightening of the cervical lordosis. Normal vertebral body height. The thoracic and lumbar vertebral bodies demonstrate normal vertebral body  height. Posterior alignment is grossly anatomic. There is an umbilical piercing. SI joints and hips are grossly intact. The iliac  crests are fairly level. Visualized lungs are grossly clear. Nonspecific bowel gas pattern. Impression  Spinal instrumentation T5-L2 with mild thoracic dextroscoliosis and lumbar  levoscoliosis. Radiographically intact hardware. Past Medical History:   Past Medical History:   Diagnosis Date    Eczema     Migraine     started one hour ago     Scoliosis     HAD SURG AT 15Y/O    Scoliosis     UTI (urinary tract infection)     at present       Past Surgical History:   Past Surgical History:   Procedure Laterality Date    HX WISDOM TEETH EXTRACTION      VT SPINE SURGERY PROCEDURE UNLISTED      for scoliosis      SocHx:   Social History     Tobacco Use    Smoking status: Current Every Day Smoker     Types: Cigarettes    Smokeless tobacco: Never Used   Substance Use Topics    Alcohol use: Yes     Comment: socially      FamHx:? No family history on file. Current Medications: Allergies:     Allergies   Allergen Reactions    Banana Other (comments)     Stomach ache    Peanut Unknown (comments)    Peanut Butter Flavor Itching and Swelling        Review of Systems:   Gen: Denied fevers, chills, malaise, fatigue, weight changes   Resp: Denied shortness of breath, cough, wheezing   CVS: Denied chest pain, palpitations   : Denied urinary urgency, frequency, incontinence   GI: Denied nausea, vomiting, constipation, diarrhea   Skin: Denied rashes, wounds   Psych: Denied anxiety, depression   Vasc: Denied claudication, ulcers   Hem: Denied easy bruising/bleeding   MSK: See HPI   Neuro: See HPI         Physical Exam     Vital Signs:   Visit Vitals  Pulse 70   Temp 97.6 °F (36.4 °C) (Tympanic)   Ht 5' 2\" (1.575 m)   Wt 138 lb (62.6 kg)   LMP 05/17/2021   SpO2 100% Comment: RA   BMI 25.24 kg/m²      General: ??????? Well nourished and well developed female without any acute distress   Psychiatric: ?  Alert and oriented x 3 with normal mood    HEENT: ???????? Atraumatic   Respiratory:   Breathing non-labored and non dyspneic   CV: ???????????????? Peripheral pulses intact, no peripheral edema   Skin: ????????????? No rashes + scar along thoracic and lumbar spineT4-L3      Neurologic: ?? Sensation: normal and grossly intact thebilateral, upper extremity(s), lower extremity(s)   Strength: 5/5 in the bilateral, upper extremity(s), lower extremity(s)  Reflexes: reveals 2+ symmetric DTRs throughout UE   Gait: normal   Upper tract signs: Sin's negative ?      Musculoskeletal: Lumbar Exam     Inspection:   Alignment: Abnormal + right thoracic prominence  Atrophy: None   Single leg stance: Abnormal increased pelvic tilt when standing right leg      Tenderness to Palpation:   Lumbar paraspinals Positive R>L  Lumbar spinous processes Negative  SI Joint:  Positive right  Gluteal:Negative   Greater trochanter: Negative   Tenderness along the right thoracic paraspinals and medial scapula,    ROM:   Lumbar ROM: Abnormal decreased lumbar motion with flexion  Lumbar facet loading: Positive  Hip ROM: No reproduction of pain with movement     Special Tests      Slump test: Negative  SLR: Negative  CAMILLA: Negative  FADIR: Negative       Assessment:   1. Thoracolumbar scoliosis s/p surgery at age 16- T5-L2  2. Lumbar pain below iqra surgery  3. ? Right Thoracic neuritis  4.? Lumbar radiculitis with weakness in RLE    Plan:      -Physical therapy - continue HEP  -Medications - motrin prn. Counseled regarding side effects and appropriate administration of medications.    -Diagnostics/Imaging -will get CT scan to evaluate below L2 degenerative changes - right nerve impingement   -Injections NA . Consider lumbar AMBREEN or facet injections, possible scapulothoracic bursa injection  -Lifestyle - continue regular aerobic activity and maintain healthy weight  -Education - The patient's diagnosis, prognosis and treatment options were discussed today. All questions were answered. F/U -F/u after CT scan.         [address] [facility]

## 2021-06-30 ENCOUNTER — TELEPHONE (OUTPATIENT)
Dept: ORTHOPEDIC SURGERY | Age: 26
End: 2021-06-30

## 2021-06-30 NOTE — TELEPHONE ENCOUNTER
Patient called for Mckenzie Wei. Patient said that her CT Scan was denied by Isiah Ortiz. That she has to get the CT Scan done at [facility]. Patient is asking if the order for the CT Scan of the Lumbar be faxed to [facility]   Fax # [phone]    Patient tel. [phone].

## 2021-07-01 ENCOUNTER — HOSPITAL ENCOUNTER (OUTPATIENT)
Dept: PHYSICAL THERAPY | Age: 26
Discharge: HOME | End: 2021-07-01
Payer: OTHER GOVERNMENT

## 2021-07-01 PROCEDURE — 97110 THERAPEUTIC EXERCISES: CPT

## 2021-07-01 PROCEDURE — 97112 NEUROMUSCULAR REEDUCATION: CPT

## 2021-07-01 PROCEDURE — 97530 THERAPEUTIC ACTIVITIES: CPT

## 2021-07-01 PROCEDURE — 97140 MANUAL THERAPY 1/> REGIONS: CPT

## 2021-07-01 NOTE — PROGRESS NOTES
PT DAILY TREATMENT NOTE    Patient Name: Ayleen Room  Date:2021  : 1995  [x]  Patient  Verified  Payor: BRANDEE / Plan: Jose Saucedo 74 / Product Type:  /    In time:5:32 pm  Out time:6:35 pm   Total Treatment Time (min): 63  Total Timed Codes (min): 48  Visit #: 7 of 12    Treatment Area: Low back pain [M54.5]    SUBJECTIVE  Pain Level (0-10 scale): 3  Any medication changes, allergies to medications, adverse drug reactions, diagnosis change, or new procedure performed?: [x] No    [] Yes (see summary sheet for update)  Subjective functional status/changes:   [] No changes reported  Pt reports that saw MD since last session. Concerned that spine is starting to curve at end of rods. Has CT scan pending to evaluate level and amount of curvature present and if surgery is warranted.      OBJECTIVE    Modalities Rationale:     decrease pain and increase tissue extensibility to improve patient's ability to perform daily activities with decreased pain and symptom levels     min [] Estim, type/location:                                      []  att     []  unatt     []  w/US     []  w/ice    []  w/heat    min []  Mechanical Traction: type/lbs                   []  pro   []  sup   []  int   []  cont    []  before manual    []  after manual    min []  Ultrasound, settings/location:      min []  Iontophoresis w/ dexamethasone, location:                                               []  take home patch       []  in clinic   10 min []  Ice     [x]  Heat    location/position: To L-spine in supine with LE elevated      min []  Vasopneumatic Device, press/temp:    If using vaso (only need to measure limb vaso being performed on)      pre-treatment girth :       post-treatment girth :       measured at (landmark location) :      min []  Other:    [x] Skin assessment post-treatment (if applicable):    [x]  intact    []  redness- no adverse reaction                  []redness - adverse reaction:        15 min Therapeutic Exercise:  [x] See flow sheet :   Rationale: increase ROM, increase strength, improve coordination and increase proprioception to improve the patients ability to perform daily activities with decreased pain and symptom levels      10 min Therapeutic Activity:  [x]  See flow sheet :reassess, discussion of potential surgery and need to continue with PT and be consistent    Rationale: increase ROM, increase strength, improve coordination and increase proprioception  to improve the patients ability to perform daily activities with decreased pain and symptom levels       20 min Neuromuscular Re-education:  [x]  See flow sheet :   Rationale: increase ROM, increase strength, improve coordination and increase proprioception  to improve the patients ability to perform daily activities with decreased pain and symptom levels      8 min Manual Therapy:  Manual stretching of left posterior hip capsule and right QL in left S/L with right iliac depression    Rationale: decrease pain, increase ROM, increase tissue extensibility, decrease trigger points and increase postural awareness to improve the patients ability to perform daily activities with decreased pain and symptom levels    The manual therapy interventions were performed at a separate and distinct time from the therapeutic activities interventions. With   [] TE   [] TA   [] neuro   [] other: Patient Education: [x] Review HEP    [] Progressed/Changed HEP based on:   [] positioning   [] body mechanics   [] transfers   [] heat/ice application    [] other:      Other Objective/Functional Measures:   Hip Add Drop Right: past midline Left: + with drop    Pain Level (0-10 scale) post treatment: 3 - post exercise    ASSESSMENT/Changes in Function:   Pt has been seen x7 visits including initial evaluation with C/C of LBP. Pt has extensive hx of LBP with Hx of scoliosis and rodding to correct as an adolescent.  Pt has recently had 2 children in close proximity of each other. Pt has pending CT scan of spine to determine if has curvature at end of iqra in L-spine. While interventions have focussed on oblique, glut and hamstring facilitation and correcting postural deficits and decreased stability, pt has only attended 7 visits in 2 months. Discussed need for pt to be consistent with attendance in order to maximized benefits of PT. Pt is in agreement. Plan to continue x 8 visits and be compliant with HEP. Patient will continue to benefit from skilled PT services to modify and progress therapeutic interventions, address functional mobility deficits, address ROM deficits, address strength deficits, analyze and address soft tissue restrictions, analyze and cue movement patterns, analyze and modify body mechanics/ergonomics, assess and modify postural abnormalities and instruct in home and community integration to attain remaining goals. []  See Plan of Care  [x]  See progress note/recertification  []  See Discharge Summary         Progress towards goals / Updated goals:  Short Term Goals: STG- To be accomplished in 5 treatment(s):  1.  Pt will be independent with HEP to encourage prophylaxis. Eval:dispensed, to be updated as pt tolerates  Current: Status on 5/27/21:compliant, reports mostly relief with current HEP, progressing        Long Term Goals: LTG- To be accomplished in 12 treatments (s):  1.  Pt will demonstrate ability bridge >10 times to full height without pain to indicate functional glut and hamstring strength. Eval:Bridging: pain with bridging on right side    Current: 7/1/21 pain with bridge on right side, decreased with cues to PPT, 1 bridge to normal height     2.  Pt will be able to forward flex and pick object up off of ground without pain to increase tolerance to daily activities.   Eval: Standing Forward Flexion fingers to floor, no reversal of lordosis (iqra), pain with standing, right rib hump  Current:  slight reversal of LS lordosis with floor reach , progressing 7/1/21     3.  Pt will improve trunk rotation to 15 in in hooklying without pain to indicate mobility needed for gait   Eval:Right: 17.5 in p!, Left: 16.5 in  Current: trunk rotation Right 13 in ,left 13.5 in with right LBP, progressing 7/1/21     4.  Pt FOTO score will increase by 10 points to show improvement in function. Eval:60  Current: 61/100 : minimal progress 6/11/21     5. Pt will be able to lift children without pain to allow pt to perform daily activities.    Eval: pain with lifting and holding child (30 pounds.)  Current: pt reported increased pain in right side and burning that worsens when lifting son: sloe progress 6/4/21    PLAN  [x]  Upgrade activities as tolerated     []  Continue plan of care  []  Update interventions per flow sheet       []  Discharge due to:_  []  Other:_      Jaydon Bueno PT, DPT 7/1/2021  5:56 PM    Future Appointments   Date Time Provider [place]elda Gonzalez   7/2/2021 11:30 AM Umm Hickey, PT [facility]

## 2021-07-01 NOTE — PROGRESS NOTES
[facility] at the [address], [place], [address] [facility]  Phone: [phone]      Fax:  [phone]    Progress Note  Patient name: Ayan Lockhart Start of Care: 2021   Referral source: Russell Lo* : 1995               Medical Diagnosis: Low back pain [M54.5]    Onset Date:2 years ago               Treatment Diagnosis: LBP   Prior Hospitalization: see medical history Provider#: [id number]   Medications: Verified on Patient summary List    Comorbidities: Severe Childhood scoliosis, Pilo placement T2-L5, hx of LBP, allergies    Prior Level of Function: Increase in pain with bending, lifting children or heavy objects, can disturb sleep (1-2 times per week)                              Visits from Start of Care: 7    Missed Visits: 3    Progress Towards Goals:   Short Term Goals: STG- To be accomplished in 5 treatment(s):  1.  Pt will be independent with HEP to encourage prophylaxis. Eval:dispensed, to be updated as pt tolerates  Status on 21:compliant, reports mostly relief with current HEP, progressing        Long Term Goals: LTG- To be accomplished in 12 treatments (s):  1.  Pt will demonstrate ability bridge >10 times to full height without pain to indicate functional glut and hamstring strength. Eval:Bridging: pain with bridging on right side    Current: able to complete 10 with PPT without pain and good height. Pt demonstrated good PPT at wall, challenged with swiss ball bridge:  progressing well 21     2.  Pt will be able to forward flex and pick object up off of ground without pain to increase tolerance to daily activities.   Eval: Standing Forward Flexion fingers to floor, no reversal of lordosis (pilo), pain with standing, right rib hump  Status on 21: slight reversal of LS lordosis with floor reach , progressing     3.  Pt will improve trunk rotation to 15 in in hooklying without pain to indicate mobility needed for gait   Eval:Right: 17.5 in p!, Left: 16.5 in  Status on 5/18/21: trunk rotation Right 13 in ,left 13.5 in with right LBP, progressing     4.  Pt FOTO score will increase by 10 points to show improvement in function. Eval:60  Current: 61/100 : minimal progress 6/11/21     5. Pt will be able to lift children without pain to allow pt to perform daily activities. Eval: pain with lifting and holding child (30 pounds.)   current: pt reported increased pain in right side and burning that worsens when lifting son: sloe progress 6/4/21       Key Functional Changes:   Pt has been seen x7 visits including initial evaluation with C/C of LBP. Pt has extensive hx of LBP with Hx of scoliosis and rodding to correct as an adolescent. Pt has recently had 2 children in close proximity of each other. Pt has pending CT scan of spine to determine if has curvature at end of iqra in L-spine. While interventions have focussed on oblique, glut and hamstring facilitation and correcting postural deficits and decreased stability, pt has only attended 7 visits in 2 months. Discussed need for pt to be consistent with attendance in order to maximized benefits of PT. Pt is in agreement. Plan to continue x 8 visits and be compliant with HEP.      Updated Goals: to be achieved in 8 treatments:   Same as above     ASSESSMENT/RECOMMENDATIONS:  [x]Continue therapy per initial plan/protocol at a frequency of  8 treatments  []Continue therapy with the following recommended changes:_____________________      _____________________________________________________________________  []Discontinue therapy progressing towards or have reached established goals  []Discontinue therapy due to lack of appreciable progress towards goals  []Discontinue therapy due to lack of attendance or compliance  []Await Physician's recommendations/decisions regarding therapy  []Other:________________________________________________________________    Thank you for this referral. Conrado Lamar, PT, DPT 7/1/2021 7:06 PM

## 2021-07-02 ENCOUNTER — HOSPITAL ENCOUNTER (OUTPATIENT)
Dept: PHYSICAL THERAPY | Age: 26
Discharge: HOME | End: 2021-07-02
Payer: OTHER GOVERNMENT

## 2021-07-02 PROCEDURE — 97530 THERAPEUTIC ACTIVITIES: CPT

## 2021-07-02 PROCEDURE — 97112 NEUROMUSCULAR REEDUCATION: CPT

## 2021-07-02 PROCEDURE — 97110 THERAPEUTIC EXERCISES: CPT

## 2021-07-02 NOTE — PROGRESS NOTES
PT DAILY TREATMENT NOTE    Patient Name: Jhonny Holiday  Date:2021  : 1995  [x]  Patient  Verified  Payor:  / Plan: Jaymie Veliz / Product Type:  /    In time:11:35 am  Out time:12:23 pm   Total Treatment Time (min): 48  Total Timed Codes (min): 48  Visit #: 8 of 15    Treatment Area: Low back pain [M54.5]    SUBJECTIVE  Pain Level (0-10 scale): 2  Any medication changes, allergies to medications, adverse drug reactions, diagnosis change, or new procedure performed?: [x] No    [] Yes (see summary sheet for update)  Subjective functional status/changes:   [] No changes reported  \"Just across my low back. \"    OBJECTIVE    15 min Therapeutic Exercise:  [x] See flow sheet :   Rationale: increase ROM, increase strength, improve coordination, improve balance and increase proprioception to improve the patients ability to perform daily activities with decreased pain and symptom levels      10 min Therapeutic Activity:  [x]  See flow sheet :   Rationale: increase ROM, increase strength, improve coordination and increase proprioception  to improve the patients ability to perform daily activities with decreased pain and symptom levels       23 min Neuromuscular Re-education:  [x]  See flow sheet :   Rationale: increase ROM, increase strength, improve coordination and increase proprioception  to improve the patients ability to perform daily activities with decreased pain and symptom levels            With   [] TE   [] TA   [] neuro   [] other: Patient Education: [x] Review HEP    [] Progressed/Changed HEP based on:   [] positioning   [] body mechanics   [] transfers   [] heat/ice application    [] other:      Other Objective/Functional Measures:   Visible fatigue with SB deadbugs   Increased cues and props for glut max and adductor pull back      Pain Level (0-10 scale) post treatment: 0    ASSESSMENT/Changes in Function:   Reported increased abdominal fatigue with SLR crossovers and bench planks. No pain at end of session. Updated HEP this session. Patient will continue to benefit from skilled PT services to modify and progress therapeutic interventions, address functional mobility deficits, address ROM deficits, address strength deficits, analyze and address soft tissue restrictions, analyze and cue movement patterns, analyze and modify body mechanics/ergonomics, assess and modify postural abnormalities and instruct in home and community integration to attain remaining goals. []  See Plan of Care  []  See progress note/recertification  []  See Discharge Summary         Progress towards goals / Updated goals:  Short Term Goals: STG- To be accomplished in 5 treatment(s):  1.  Pt will be independent with HEP to encourage prophylaxis. Eval:dispensed, to be updated as pt tolerates  Last PN: 5/27/21:compliant, reports mostly relief with current HEP, progressing  Current: progressing 7/2/21 updated this session      Long Term Goals: LTG- To be accomplished in 12 treatments (s):  1.  Pt will demonstrate ability bridge >10 times to full height without pain to indicate functional glut and hamstring strength. Eval:Bridging: pain with bridging on right side    Last PN: able to complete 10 with PPT without pain and good height. Pt demonstrated good PPT at wall, challenged with swiss ball bridge:  progressing well 6/11/21     2.  Pt will be able to forward flex and pick object up off of ground without pain to increase tolerance to daily activities.   Eval: Standing Forward Flexion fingers to floor, no reversal of lordosis (iqra), pain with standing, right rib hump  Last PN: slight reversal of LS lordosis with floor reach , progressing     3.  Pt will improve trunk rotation to 15 in in hooklying without pain to indicate mobility needed for gait   Eval:Right: 17.5 in p!, Left: 16.5 in  Last PN: 5/18/21: trunk rotation Right 13 in ,left 13.5 in with right LBP, progressing     4.  Pt FOTO score will increase by 10 points to show improvement in function. Eval:60  Last PN: 61/100 : minimal progress 6/11/21     5. Pt will be able to lift children without pain to allow pt to perform daily activities.    Eval: pain with lifting and holding child (30 pounds.)  Last PN: pt reported increased pain in right side and burning that worsens when lifting son: slow progress 6/4/21    PLAN  []  Upgrade activities as tolerated     []  Continue plan of care  []  Update interventions per flow sheet       []  Discharge due to:_  []  Other:_      Dunia Carballo, PT, DPT 7/2/2021  12:03 PM    Future Appointments   Date Time Provider Imelda Gonzalez   7/9/2021 11:30 AM Hodan Bah, PT, DPT [facility]

## 2021-07-09 ENCOUNTER — HOSPITAL ENCOUNTER (OUTPATIENT)
Dept: PHYSICAL THERAPY | Age: 26
Discharge: HOME | End: 2021-07-09
Payer: OTHER GOVERNMENT

## 2021-07-09 PROCEDURE — 97530 THERAPEUTIC ACTIVITIES: CPT

## 2021-07-09 PROCEDURE — 97112 NEUROMUSCULAR REEDUCATION: CPT

## 2021-07-09 PROCEDURE — 97110 THERAPEUTIC EXERCISES: CPT

## 2021-07-09 NOTE — PROGRESS NOTES
PT DAILY TREATMENT NOTE    Patient Name: Mitesh Gant  Date:2021  : 1995  [x]  Patient  Verified  Payor: BRANDEE / Plan: Jose Saucedo 74 / Product Type: Oscar Simpson /    In time:11:40  Out time:12:30  Total Treatment Time (min): 50  Total Timed Codes (min): 50  1:1 Treatment Time ( [address] only): 50   Visit #: 9 of 15    Treatment Area: Low back pain [M54.5]    SUBJECTIVE  Pain Level (0-10 scale): 6  Any medication changes, allergies to medications, adverse drug reactions, diagnosis change, or new procedure performed?: [x] No    [] Yes (see summary sheet for update)  Subjective functional status/changes:   [] No changes reported  Pt reporting increased pain today since CT scan yesterday with nothing helping pain.      OBJECTIVE      20 min Therapeutic Exercise:  [x] See flow sheet :   Rationale: increase ROM and increase strength to improve the patients ability to perform daily activities with decreased pain and symptom levels    15 min Therapeutic Activity:  [x]  See flow sheet : pt education on diastisis, gym workouts, importance of PPT   Rationale: increase ROM and increase strength  to improve the patients ability to perform daily activities with decreased pain and symptom levels     15 min Neuromuscular Re-education:  [x]  See flow sheet :   Rationale: increase strength, improve coordination and increase proprioception  to improve the patients ability to perform daily activities with decreased pain and symptom levels          With   [] TE   [] TA   [] neuro   [] other: Patient Education: [x] Review HEP    [] Progressed/Changed HEP based on:   [] positioning   [] body mechanics   [] transfers   [] heat/ice application    [] other:      Other Objective/Functional Measures:   2.5 finger width diastasis  Good response to lady in glasses and standing left hip hike      Pain Level (0-10 scale) post treatment: 4    ASSESSMENT/Changes in Function: Pt reporting to therapy today with increased low back pain however willing to participate and reporting decreased pain post session. Pt responds well to right QL depression in s/l and in standing. Pt challenged with maintaining PPT with bridge with hip drop noted with bridge march and tactile cues to maintain with SB deadbugs. Patient will continue to benefit from skilled PT services to modify and progress therapeutic interventions, address functional mobility deficits, address ROM deficits, address strength deficits, analyze and cue movement patterns, analyze and modify body mechanics/ergonomics, assess and modify postural abnormalities and instruct in home and community integration to attain remaining goals. []  See Plan of Care  []  See progress note/recertification  []  See Discharge Summary         Progress towards goals / Updated goals:  Short Term Goals: STG- To be accomplished in 5 treatment(s):  1.  Pt will be independent with HEP to encourage prophylaxis. Eval:dispensed, to be updated as pt tolerates  Last PN: 5/27/21:compliant, reports mostly relief with current HEP, progressing  Current: progressing 7/2/21 updated this session      Long Term Goals: LTG- To be accomplished in 12 treatments (s):  1.  Pt will demonstrate ability bridge >10 times to full height without pain to indicate functional glut and hamstring strength. Eval:Bridging: pain with bridging on right side    Last PN: able to complete 10 with PPT without pain and good height. Pt demonstrated good PPT at wall, challenged with swiss ball bridge:  progressing well 6/11/21  Current: hip drop noted with bridge march bilaterally      2.  Pt will be able to forward flex and pick object up off of ground without pain to increase tolerance to daily activities.   Eval: Standing Forward Flexion fingers to floor, no reversal of lordosis (iqra), pain with standing, right rib hump  Last PN: slight reversal of LS lordosis with floor reach , progressing     3.  Pt will improve trunk rotation to 15 in in hooklying without pain to indicate mobility needed for gait   Eval:Right: 17.5 in p!, Left: 16.5 in  Last PN: 5/18/21: trunk rotation Right 13 in ,left 13.5 in with right LBP, progressing     4.  Pt FOTO score will increase by 10 points to show improvement in function. Eval:60  Last PN: 61/100 : minimal progress 6/11/21     5. Pt will be able to lift children without pain to allow pt to perform daily activities.    Eval: pain with lifting and holding child (30 pounds.)  Last PN: pt reported increased pain in right side and burning that worsens when lifting son: slow progress 6/4/21    PLAN  []  Upgrade activities as tolerated     [x]  Continue plan of care  []  Update interventions per flow sheet       []  Discharge due to:_  []  Other:_      Oren Caputo 7/9/2021  11:48 AM    Future Appointments   Date Time Provider Imelda Gonzalez   7/12/2021 11:45 AM Eleni Rosen MD [facility]

## 2021-07-14 ENCOUNTER — APPOINTMENT (OUTPATIENT)
Dept: PHYSICAL THERAPY | Age: 26
End: 2021-07-14
Payer: OTHER GOVERNMENT

## 2021-07-14 ENCOUNTER — OFFICE VISIT (OUTPATIENT)
Dept: ORTHOPEDIC SURGERY | Age: 26
End: 2021-07-14
Payer: OTHER GOVERNMENT

## 2021-07-14 VITALS
HEIGHT: 62 IN | TEMPERATURE: 97.5 F | WEIGHT: 142 LBS | BODY MASS INDEX: 26.13 KG/M2 | OXYGEN SATURATION: 100 % | HEART RATE: 89 BPM

## 2021-07-14 DIAGNOSIS — G89.29 CHRONIC RIGHT-SIDED LOW BACK PAIN WITHOUT SCIATICA: ICD-10-CM

## 2021-07-14 DIAGNOSIS — M54.50 CHRONIC RIGHT-SIDED LOW BACK PAIN WITHOUT SCIATICA: ICD-10-CM

## 2021-07-14 DIAGNOSIS — M54.50 CHRONIC BILATERAL LOW BACK PAIN WITHOUT SCIATICA: Primary | ICD-10-CM

## 2021-07-14 DIAGNOSIS — G89.29 CHRONIC BILATERAL LOW BACK PAIN WITHOUT SCIATICA: Primary | ICD-10-CM

## 2021-07-14 PROCEDURE — 99213 OFFICE O/P EST LOW 20 MIN: CPT | Performed by: PHYSICAL MEDICINE & REHABILITATION

## 2021-07-14 NOTE — PROGRESS NOTES
Jhonny Navarro presents today for   Chief Complaint   Patient presents with    Back Pain       Is someone accompanying this pt? no    Is the patient using any DME equipment during OV? no    Coordination of Care:  1. Have you been to the ER, urgent care clinic since your last visit? no  Hospitalized since your last visit? no    2. Have you seen or consulted any other health care providers outside of the [address] [place] [address] since your last visit? no Include any pap smears or colon screening.  no

## 2021-07-14 NOTE — PROGRESS NOTES
[address]  [address], [address]  [place], [address] [place] [address] Street  Phone: [phone]  Fax: [phone]      Patient: Delia Alfred                                                                              MRN: [id number]        YOB: 1995          AGE: 23 y.o. PCP: Kurtis Abreu MD  Date:  07/14/21    Reason for Consultation: Back Pain      HPI:  Delia Alfred is a 23 y.o. female with relevant PMH of scoliosis dx at age 9, tried bracing and then ultimately had surgery T5-L2 (?[place] iqra surgery) at age 16 [facility], post surgery was active ran cross country and did gymanstic. With her first pregnancy about 1.5  years ago she had right sided low back pain radiating into her right leg that improved once she delivered. She became pregnant with her second son shortly after and during this pregnancy had severe low back pain and right thoracic/scapular pain. She also noticed numbness in her right scapula. After delivery 3 months ago the numbness improved but she continues to have constant right  low back pain and intermittent right thoracic pain. She has tried physical therapy, temporary relief with massage but no lasting relief. We got a CT scan of her lumbar spine which demonstrates  Intact hardware to L2. Small disc bulge L4/5 with mild left foraminal narrowing and L5-S1 disc bulge, early degenerative changes right SI joint, facet hypertrophy on the right L3/4        Neurologic symptoms: No numbness, tingling, weakness, bowel or bladder changes. No recent falls      Location: The pain is located in the constant low back, intermitent right upper and scapular pain. Radiation: The pain does not radiate   Pain Score: Currently: 6/10  At Best: 3/10  At Worst: 10/10   Quality: Pain is of a Achy quality. Aggravating: Pain is exacerbated by bending forward  Alleviating:  The pain is alleviated by heat    Prior Treatments:   Physical therapy- 2013, currently in PT   Previous Medications:  Lidocaine patch  Current Medications: Aleve  Previous work-up has included:   XR Results (most recent):  Results from Orders Only encounter on 04/22/21    XR SPINE LUMB 2 OR 3 V    Narrative  Entire spine and Lumbar spine 2 views    HISTORY: Postop    COMPARISON: None    FINDINGS: Frontal and lateral imaging of the cervical, thoracic, and lumbar  spine. Additional frontal and lateral imaging of the lumbar spine also obtained. A total of 11 images    Spinal instrumentation spanning T5-L2. The hardware appears intact without  finding for hardware fracture. No suspicious periprosthetic lucencies. There is  a mild levoscoliosis of the lumbar spine apex at L1-L2. And there is a mild  dextroscoliosis apex at T7-T8. There is straightening of the cervical lordosis. Normal vertebral body height. The thoracic and lumbar vertebral bodies demonstrate normal vertebral body  height. Posterior alignment is grossly anatomic. There is an umbilical piercing. SI joints and hips are grossly intact. The iliac  crests are fairly level. Visualized lungs are grossly clear. Nonspecific bowel gas pattern. Impression  Spinal instrumentation T5-L2 with mild thoracic dextroscoliosis and lumbar  levoscoliosis. Radiographically intact hardware. Past Medical History:   Past Medical History:   Diagnosis Date    Eczema     Migraine     started one hour ago     Scoliosis     HAD SURG AT 15Y/O    Scoliosis     UTI (urinary tract infection)     at present       Past Surgical History:   Past Surgical History:   Procedure Laterality Date    HX WISDOM TEETH EXTRACTION      RI SPINE SURGERY PROCEDURE UNLISTED      for scoliosis      SocHx:   Social History     Tobacco Use    Smoking status: Current Every Day Smoker     Types: Cigarettes    Smokeless tobacco: Never Used   Substance Use Topics    Alcohol use: Yes     Comment: socially      FamHx:? No family history on file. Current Medications: Allergies: Allergies   Allergen Reactions    Banana Other (comments)     Stomach ache    Peanut Unknown (comments)    Peanut Butter Flavor Itching and Swelling        Review of Systems:   Gen:    Denied fevers, chills, malaise, fatigue, weight changes   Resp: Denied shortness of breath, cough, wheezing   CVS: Denied chest pain, palpitations   : Denied urinary urgency, frequency, incontinence   GI: Denied nausea, vomiting, constipation, diarrhea   Skin: Denied rashes, wounds   Psych: Denied anxiety, depression   Vasc: Denied claudication, ulcers   Hem: Denied easy bruising/bleeding   MSK: See HPI   Neuro: See HPI         Physical Exam     Vital Signs:   Visit Vitals  Pulse 89   Temp 97.5 °F (36.4 °C) (Tympanic)   Ht 5' 2\" (1.575 m)   Wt 142 lb (64.4 kg)   SpO2 100% Comment: RA   BMI 25.97 kg/m²      General: ??????? Well nourished and well developed female without any acute distress   Psychiatric: ?  Alert and oriented x 3 with normal mood    HEENT: ???????? Atraumatic   Respiratory:   Breathing non-labored and non dyspneic   CV: ???????????????? Peripheral pulses intact, no peripheral edema   Skin: ????????????? No rashes + scar along thoracic and lumbar spineT4-L3      Neurologic: ?? Sensation: normal and grossly intact thebilateral, upper extremity(s), lower extremity(s)   Strength: 5/5 in the bilateral, upper extremity(s), lower extremity(s)  Reflexes: reveals 2+ symmetric DTRs throughout UE   Gait: normal   Upper tract signs: Sin's negative ?      Musculoskeletal: Lumbar Exam     Inspection:   Alignment: Abnormal + right thoracic prominence  Atrophy: None   Single leg stance: Abnormal increased pelvic tilt when standing right leg      Tenderness to Palpation:   Lumbar paraspinals Positive R>L  Lumbar spinous processes Negative  SI Joint:  Positive right  Gluteal:Negative   Greater trochanter: Negative   Tenderness along the right thoracic paraspinals and medial scapula,    ROM:   Lumbar ROM: Abnormal decreased lumbar motion with flexion-pain  Lumbar facet loading: Positive  Hip ROM: No reproduction of pain with movement     Special Tests      Slump test: Negative  SLR: Negative  CAMILLA: Negative  FADIR: Negative       Assessment:   1. Thoracolumbar scoliosis s/p surgery at age 16- T5-L2  2. Lumbar pain below iqra surgery with L4/5 and L5/S1 small disc bulges   3. ? Right Thoracic neuritis- improving      Plan:      -Physical therapy - continue HEP  -Medications - motrin prn.  Counseled regarding side effects and appropriate administration of medications.    -Diagnostics/Imaging -reviewed  CT scanchanges - right nerve impingement   -Injections -referral to try (right paracentral) L4-5 Il AMBREEN, if no relief would consider facet injection or potential right SI joint injection  -F.u after AMBREEN        Mariusz [facility]

## 2021-07-15 ENCOUNTER — HOSPITAL ENCOUNTER (OUTPATIENT)
Dept: PHYSICAL THERAPY | Age: 26
Discharge: HOME | End: 2021-07-15
Payer: OTHER GOVERNMENT

## 2021-07-15 PROCEDURE — 97112 NEUROMUSCULAR REEDUCATION: CPT

## 2021-07-15 PROCEDURE — 97110 THERAPEUTIC EXERCISES: CPT

## 2021-07-15 NOTE — PROGRESS NOTES
PT DAILY TREATMENT NOTE    Patient Name: Joanie Iqbal  Date:7/15/2021  : 1995  [x]  Patient  Verified  Payor:  / Plan: Prasad Parnell / Product Type:  /    In time:10:08  Out time:10:46  Total Treatment Time (min): 38  Total Timed Codes (min): 38  1:1 Treatment Time ([facility] only): 38   Visit #: 10 of 15    Treatment Dx: Low back pain [M54.5]    SUBJECTIVE  Pain Level (0-10 scale): 6  Any medication changes, allergies to medications, adverse drug reactions, diagnosis change, or new procedure performed?: [x] No    [] Yes (see summary sheet for update)  Subjective functional status/changes:   [] No changes reported  \"Its been hurting pretty bad. My Dr. العراقي Arrow to start epidural shots. \"     OBJECTIVE    Modalities Rationale:     decrease edema, decrease inflammation and decrease pain to improve patient's ability to perform pain free ADL\"s    min [] Estim, type/location:                                      []  att     []  unatt     []  w/US     []  w/ice    []  w/heat    min []  Mechanical Traction: type/lbs                   []  pro   []  sup   []  int   []  cont    []  before manual    []  after manual    min []  Ultrasound, settings/location:      min []  Iontophoresis w/ dexamethasone, location:                                               []  take home patch       []  in clinic    min []  Ice     []  Heat    location/position:     min []  Vasopneumatic Device, press/temp:    If using vaso (only need to measure limb vaso being performed on)      pre-treatment girth :       post-treatment girth :       measured at (landmark location) :      min []  Other:    [x] Skin assessment post-treatment (if applicable):    []  intact    []  redness- no adverse reaction                  []redness - adverse reaction:            25 min Therapeutic Exercise:  [] See flow sheet :   Rationale: increase ROM, increase strength, improve coordination and improve balance to improve the patients ability to Perform pain free ADL's      13 min Neuromuscular Re-education:  []  See flow sheet :   Rationale: increase ROM, increase strength and improve coordination  to improve the patients ability to perform pain free ADL's       With   [x] TE   [] TA   [] neuro   [] other: Patient Education: [x] Review HEP    [] Progressed/Changed HEP based on:   [] positioning   [] body mechanics   [] transfers   [] heat/ice application    [] other:      Other Objective/Functional Measures:   LTR  Right 16.5 Left 16.5     Pain Level (0-10 scale) post treatment: 6    ASSESSMENT/Changes in Function:   Pt presented in therapy with c/o increased pain . Stated MD found a larger facet joint in her right lumbar spine per pt report. and would like to start a series of epidural injections, per pt report. Pt was able to tolerate exercises fairly well, however reported mild pain with 90/90 and required cues for readjusting and decreasing Lumbar paraspinal activation. Notably challenged with  deadbug, bridge with march and bench plank. Patient will continue to benefit from skilled PT services to modify and progress therapeutic interventions, address functional mobility deficits, address ROM deficits, address strength deficits, analyze and address soft tissue restrictions, analyze and cue movement patterns, analyze and modify body mechanics/ergonomics, assess and modify postural abnormalities, address imbalance/dizziness and instruct in home and community integration to attain remaining goals. [x]  See Plan of Care  []  See progress note/recertification  []  See Discharge Summary         Progress towards goals / Updated goals:    Short Term Goals: STG- To be accomplished in 5 treatment(s):  1.  Pt will be independent with HEP to encourage prophylaxis.   Eval:dispensed, to be updated as pt tolerates  Last PN: 5/27/21:compliant, reports mostly relief with current HEP, progressing  Current: progressing 7/15/21 reviewed this session      Long Term Goals: LTG- To be accomplished in 12 treatments (s):  1.  Pt will demonstrate ability bridge >10 times to full height without pain to indicate functional glut and hamstring strength. Eval:Bridging: pain with bridging on right side    Last PN: able to complete 10 with PPT without pain and good height. Pt demonstrated good PPT at wall, challenged with swiss ball bridge:  progressing well 6/11/21  Current: Continued hip drop noted with bridge march bilaterally : Progressing 7/15/21     2.  Pt will be able to forward flex and pick object up off of ground without pain to increase tolerance to daily activities. Eval: Standing Forward Flexion fingers to floor, no reversal of lordosis (iqra), pain with standing, right rib hump  Last PN: slight reversal of LS lordosis with floor reach , progressing     3.  Pt will improve trunk rotation to 15 in in hooklying without pain to indicate mobility needed for gait   Eval:Right: 17.5 in p!, Left: 16.5 in  Last PN: 5/18/21: trunk rotation Right 13 in ,left 13.5 in with right LBP, progressing     4.  Pt FOTO score will increase by 10 points to show improvement in function. Eval:60  Last PN: 61/100 : minimal progress 6/11/21     5. Pt will be able to lift children without pain to allow pt to perform daily activities.    Eval: pain with lifting and holding child (30 pounds.)  Last PN: pt reported increased pain in right side and burning that worsens when lifting son: slow progress 6/4/21     PLAN  []  Upgrade activities as tolerated     [x]  Continue plan of care  []  Update interventions per flow sheet       []  Discharge due to:_  []  Other:_      Vlad Rodríguez PTA 7/15/2021  10:14 AM    Future Appointments   Date Time Provider Imelda Gonzalez   7/16/2021  1:30 PM Lurdes Amato PTA [facility]

## 2021-07-16 ENCOUNTER — HOSPITAL ENCOUNTER (OUTPATIENT)
Dept: PHYSICAL THERAPY | Age: 26
Discharge: HOME | End: 2021-07-16
Payer: OTHER GOVERNMENT

## 2021-07-16 PROCEDURE — 97112 NEUROMUSCULAR REEDUCATION: CPT

## 2021-07-16 PROCEDURE — 97110 THERAPEUTIC EXERCISES: CPT

## 2021-07-16 PROCEDURE — 97140 MANUAL THERAPY 1/> REGIONS: CPT

## 2021-07-16 NOTE — PROGRESS NOTES
PT DAILY TREATMENT NOTE    Patient Name: Osman Melo  Date:2021  : 1995  [x]  Patient  Verified  Payor:  / Plan: Jose Saucedo 75 / Product Type:  /    In time:2:17  Out time: 3:10  Total Treatment Time (min): 53  Total Timed Codes (min): 43  1:1 Treatment Time ( [address] only): 35   Visit #: 11 of 15    Treatment Area: Low back pain [M54.5]    SUBJECTIVE  Pain Level (0-10 scale): 610  Any medication changes, allergies to medications, adverse drug reactions, diagnosis change, or new procedure performed?: [x] No    [] Yes (see summary sheet for update)  Subjective functional status/changes:   [] No changes reported  Pt reports that she is sore today. Reports that she is complaint with HEP.      OBJECTIVE:  Pt enters gym in no apparent distress    Modalities Rationale:     decrease pain to improve patient's ability to improve patient's ability to perform pain free ADL's    min [] Estim, type/location:                                      []  att     []  unatt     []  w/US     []  w/ice    []  w/heat    min []  Mechanical Traction: type/lbs                   []  pro   []  sup   []  int   []  cont    []  before manual    []  after manual    min []  Ultrasound, settings/location:      min []  Iontophoresis w/ dexamethasone, location:                                               []  take home patch       []  in clinic   10 min []  Ice     [x]  Heat    location/position: Pt reclined with bolster under legs, MH to back and right hip    min []  Vasopneumatic Device, press/temp:     min []  Other:    [] Skin assessment post-treatment (if applicable):    []  intact    []  redness- no adverse reaction     []redness - adverse reaction:            18 min Therapeutic Exercise:  [x] See flow sheet :   Rationale: increase ROM and increase strength to improve the patients ability to perform daily activities with decreased pain and symptom levels    10 min Manual Therapy:  Pt in left sidelying with bolster under left trunk, right LE extended with peanut bolster under leg, MFR superficial to Right QL with right iliac depression, MFR intramuscular to Right QL, right glut med, right glut min, Right TFL   The manual therapy interventions were performed at a separate and distinct time from the therapeutic activities interventions. Rationale:  decrease pain, increase tissue extensibility and decrease trigger points to perform daily activities with decreased pain and symptom levels     15 min Neuromuscular Re-education:  [x]  See flow sheet :   Rationale: improve coordination, improve balance, and increase proprioception of pelvis to improve the patients ability to perform daily activities with decreased pain and symptom levels. With   [x] TE   [] TA   [] neuro   [] other: Patient Education: [x] Review HEP    [] Progressed/Changed HEP based on:   [] positioning   [] body mechanics   [] transfers   [] heat/ice application    [x] other: educated pt on log roll technique     Other Objective/Functional Measures: Please see below     Pain Level (0-10 scale) post treatment: 4/10    ASSESSMENT/Changes in Function: Patient tolerated therapy session well as there were no adverse reactions today. Pt reporting inc pain this therapy session. Pt with muscle hypertonicity so addressed with manual therapy. Added samurai to help inc core strength. Added SB roll out to POC as well and pt required cuing to keep PPT. Pt is progressing with therapy as indicated by pt tolerating increase in exercise repetitions and resistance. Although showing progress patient would benefit from continuation of skilled physical therapy to address the remaining limitations.      Patient will continue to benefit from skilled PT services to modify and progress therapeutic interventions, address functional mobility deficits, address ROM deficits, address strength deficits, analyze and address soft tissue restrictions, analyze and cue movement patterns, analyze and modify body mechanics/ergonomics, assess and modify postural abnormalities and instruct in home and community integration to attain remaining goals. []  See Plan of Care  []  See progress note/recertification  []  See Discharge Summary         Progress towards goals / Updated goals:  Short Term Goals: STG- To be accomplished in 5 treatment(s):  1.  Pt will be independent with HEP to encourage prophylaxis. Eval:dispensed, to be updated as pt tolerates  Last PN: 5/27/21:compliant, reports mostly relief with current HEP, progressing  Current: progressing 7/16/21 reviewed this session, pt reports compliance      Long Term Goals: LTG- To be accomplished in 12 treatments (s):  1.  Pt will demonstrate ability bridge >10 times to full height without pain to indicate functional glut and hamstring strength. Eval:Bridging: pain with bridging on right side    Last PN: able to complete 10 with PPT without pain and good height. Pt demonstrated good PPT at wall, challenged with swiss ball bridge:  progressing well 6/11/21  Current: Continued hip drop noted with bridge march bilaterally : Progressing 7/15/21     2.  Pt will be able to forward flex and pick object up off of ground without pain to increase tolerance to daily activities. Eval: Standing Forward Flexion fingers to floor, no reversal of lordosis (iqra), pain with standing, right rib hump  Last PN: slight reversal of LS lordosis with floor reach , progressing     3.  Pt will improve trunk rotation to 15 in in hooklying without pain to indicate mobility needed for gait   Eval:Right: 17.5 in p!, Left: 16.5 in  Last PN: 5/18/21: trunk rotation Right 13 in ,left 13.5 in with right LBP, progressing     4.  Pt FOTO score will increase by 10 points to show improvement in function. Eval:60  Last PN: 61/100 : minimal progress 6/11/21  Current:7/16/21: 57 regression, pt in increased pain this therapy session     5.  Pt will be able to lift children without pain to allow pt to perform daily activities.    Eval: pain with lifting and holding child (30 pounds.)  Last PN: pt reported increased pain in right side and burning that worsens when lifting son: slow progress 6/4/21    PLAN  [x]  Upgrade activities as tolerated     [x]  Continue plan of care  [x]  Update interventions per flow sheet       []  Discharge due to:_  []  Other:_      Will Cedeno, PT, DPT, CIMT 7/16/2021  1:27 PM    Future Appointments   Date Time Provider Imelda Gonzalez   7/16/2021  2:15 PM Troy Colunga, PT [facility]

## 2021-07-21 ENCOUNTER — TELEPHONE (OUTPATIENT)
Dept: ORTHOPEDIC SURGERY | Age: 26
End: 2021-07-21

## 2021-07-21 RX ORDER — DICLOFENAC SODIUM 50 MG/1
50 TABLET, DELAYED RELEASE ORAL 2 TIMES DAILY WITH MEALS
Qty: 30 TABLET | Refills: 0 | Status: SHIPPED | OUTPATIENT
Start: 2021-07-21 | End: 2021-08-05

## 2021-07-21 NOTE — TELEPHONE ENCOUNTER
Spoke to patient rx for diclofenac sent to pharmacy.   Will see if we can get her scheduled for pravin

## 2021-07-21 NOTE — TELEPHONE ENCOUNTER
Patient called asking if we can prescribe anything for her pain while she's waiting to be set up for a spinal block injection. She says she has regular Ibuprofen but it's not working for her. She confirmed her pharmacy is [facility] and she's requesting a call back at [phone].

## 2021-08-05 ENCOUNTER — HOSPITAL ENCOUNTER (OUTPATIENT)
Age: 26
Setting detail: OUTPATIENT SURGERY
Discharge: HOME | End: 2021-08-05
Attending: PHYSICAL MEDICINE & REHABILITATION | Admitting: PHYSICAL MEDICINE & REHABILITATION

## 2021-08-05 ENCOUNTER — APPOINTMENT (OUTPATIENT)
Dept: GENERAL RADIOLOGY | Age: 26
End: 2021-08-05
Attending: PHYSICAL MEDICINE & REHABILITATION

## 2021-08-05 RX ORDER — DIAZEPAM 5 MG/1
5-20 TABLET ORAL ONCE
Status: DISCONTINUED | OUTPATIENT
Start: 2021-08-05 | End: 2021-08-05 | Stop reason: HOSPADM

## 2021-08-05 NOTE — DISCHARGE INSTRUCTIONS
[facility]   (MATILDE)  Dr. Chester Palacios, Dr. Yue Amato, Dr. Ayden Andrade Spinal Procedure (Block) Instructions    * Do not drive a car, operate heavy machinery or dangerous equipment, or make important decisions for 12-24 hours. * Light activity as tolerated; may rest for the remainder of the day. * Resume pre-block medications including those from your other doctors. * Do not drink alcoholic beverages for 24 hours. Alcohol and the medications you have received may interact and cause an adverse reaction. * You may feel better this evening and worse tomorrow, as the numbing medications wears off and the steroid has yet to begin to work. After 48-72 hrs the steroid should begin to release bringing you relief. If you had a medial branch block, no steroids were used. The medial branch block is a test to see if you are a candidate for radiofrequency ablation (RFA). The anesthetic (numbing medicine)  will wear off by the next day. * You may shower this evening and remove any bandages. * Avoid hot tubs/pools/tub soaks and heating pads for 24 hours. You may use cold packs on the procedure site as tolerated for the first 24 hours. * If a headache develops, drink plenty of fluids and rest.  Take over the counter medications for headache if needed. If the headache continues longer than 24 hours, call MD at the [address] [place] [address] Avenue. [phone]    * Continue taking pain medications as needed. * You may resume your regular diet if tolerated. Otherwise, start with sips of water and advance slowly. * If Diabetic: check your blood sugar three times a day for the next 3 days. If your sugar is greater than 300 call your family doctor. If your sugar is greater than 400, have someone transport you to the nearest Emergency Room. * If you experience any of the following problems, Please Call the [address] [place] [address] Avenue at [phone].         * Excessive pain, swelling, redness or odor at or around the surgical area    * Fever of 101 or higher    * Nausea / Vomiting lasting longer than 4 hours or if unable to take medications. * Severe Headache    * Weakness or numbness in arms or legs that is not      resolving   * Any NEW signs of decreased circulation or nerve impairment in leg: change in color, swelling, persistent numbness, tingling                    * Prolonged increase in pain greater than 4 days      PATIENT INSTRUCTIONS:    After oral sedation, for 12-24 hours or while taking prescription Narcotics:  · Limit your activities  · Do not drive and operate hazardous machinery  · Do not make important personal or business decisions  · Do  not drink alcoholic beverages  · If you have not urinated within 8 hours after discharge, please contact your surgeon on call. *  Please give a list of your current medications to your Primary Care Provider. *  Please update this list whenever your medications are discontinued, doses are      changed, or new medications (including over-the-counter products) are added. *  Please carry medication information at all times in case of emergency situations. These are general instructions for a healthy lifestyle:    No smoking/ No tobacco products/ Avoid exposure to second hand smoke    Surgeon General's Warning:  Quitting smoking now greatly reduces serious risk to your health. Obesity, smoking, and sedentary lifestyle greatly increases your risk for illness    A healthy diet, regular physical exercise & weight monitoring are important for maintaining a healthy lifestyle    You may be retaining fluid if you have a history of heart failure or if you experience any of the following symptoms:  Weight gain of 3 pounds or more overnight or 5 pounds in a week, increased swelling in our hands or feet or shortness of breath while lying flat in bed.   Please call your doctor as soon as you notice any of these symptoms; do not wait until your next office visit. Recognize signs and symptoms of STROKE:    F-face looks uneven    A-arms unable to move or move unevenly    S-speech slurred or non-existent    T-time-call 911 as soon as signs and symptoms begin-DO NOT go       Back to bed or wait to see if you get better-TIME IS BRAIN.

## 2021-09-20 ENCOUNTER — VIRTUAL VISIT (OUTPATIENT)
Dept: ORTHOPEDIC SURGERY | Age: 26
End: 2021-09-20
Payer: OTHER GOVERNMENT

## 2021-09-20 DIAGNOSIS — M54.50 CHRONIC BILATERAL LOW BACK PAIN WITHOUT SCIATICA: Primary | ICD-10-CM

## 2021-09-20 DIAGNOSIS — G89.29 CHRONIC BILATERAL LOW BACK PAIN WITHOUT SCIATICA: Primary | ICD-10-CM

## 2021-09-20 PROCEDURE — 99214 OFFICE O/P EST MOD 30 MIN: CPT | Performed by: PHYSICAL MEDICINE & REHABILITATION

## 2021-09-20 RX ORDER — GABAPENTIN 300 MG/1
300 CAPSULE ORAL
Qty: 30 CAPSULE | Refills: 0 | Status: SHIPPED | OUTPATIENT
Start: 2021-09-20 | End: 2021-12-07

## 2021-09-20 NOTE — PROGRESS NOTES
[address]  [address], [address]  [place], [address] [place] [address] Street  Phone: [phone]  Fax: [phone]      Patient: Esther Franco                                                                              MRN: [id number]        YOB: 1995          AGE: 23 y.o. PCP: Dane Valladares MD  Date:  09/20/21    Reason for Consultation: No chief complaint on file. Video visit    HPI:  Esther Franco is a 23 y.o. female with relevant PMH of scoliosis dx at age 9, tried bracing and then ultimately had surgery T5-L2 (?[place] iqra surgery) at age 16 in [facility], post surgery was active ran cross country and did gymanstic. With her first pregnancy about 1.5  years ago she had right sided low back pain radiating into her right leg that improved once she delivered. She became pregnant with her second son shortly after and during this pregnancy had severe low back pain and right thoracic/scapular pain. She also noticed numbness in her right scapula. After delivery 3 months ago the numbness improved but she continues to have constant right  low back pain and intermittent right thoracic pain. She has tried physical therapy, temporary relief with massage but no lasting relief. We got a CT scan of her lumbar spine which demonstrates  Intact hardware to L2. Small disc bulge L4/5 with mild left foraminal narrowing and L5-S1 disc bulge, early degenerative changes right SI joint, facet hypertrophy on the right L3/4. She was going to try an AMBREEN but unfortunately was on NSAIDS prior to procedure and had to reschedule. Diclofenac and motrin have not helped. Neurologic symptoms: No numbness, tingling, weakness, bowel or bladder changes. No recent falls      Location: The pain is located in the constant low back right  Radiation: The pain does not radiate   Pain Score: Currently: 6/10  At Best: 3/10  At Worst: 10/10   Quality: Pain is of a Achy quality. Aggravating: Pain is exacerbated by bending forward  Alleviating: The pain is alleviated by heat    Prior Treatments:   Physical therapy- 2013, currently in PT   Previous Medications:  Lidocaine patch  Current Medications: Aleve, motrin, diclofenac  Previous work-up has included:   XR Results (most recent):  Results from Orders Only encounter on 04/22/21    XR SPINE LUMB 2 OR 3 V    Narrative  Entire spine and Lumbar spine 2 views    HISTORY: Postop    COMPARISON: None    FINDINGS: Frontal and lateral imaging of the cervical, thoracic, and lumbar  spine. Additional frontal and lateral imaging of the lumbar spine also obtained. A total of 11 images    Spinal instrumentation spanning T5-L2. The hardware appears intact without  finding for hardware fracture. No suspicious periprosthetic lucencies. There is  a mild levoscoliosis of the lumbar spine apex at L1-L2. And there is a mild  dextroscoliosis apex at T7-T8. There is straightening of the cervical lordosis. Normal vertebral body height. The thoracic and lumbar vertebral bodies demonstrate normal vertebral body  height. Posterior alignment is grossly anatomic. There is an umbilical piercing. SI joints and hips are grossly intact. The iliac  crests are fairly level. Visualized lungs are grossly clear. Nonspecific bowel gas pattern. Impression  Spinal instrumentation T5-L2 with mild thoracic dextroscoliosis and lumbar  levoscoliosis. Radiographically intact hardware. CT lumbar spine   CT scan of her lumbar spine which demonstrates  Intact hardware to L2. Small disc bulge L4/5 with mild left foraminal narrowing and L5-S1 disc bulge, early degenerative changes right SI joint, facet hypertrophy on the right L3/4.   Past Medical History:   Past Medical History:   Diagnosis Date    Eczema     Migraine     started one hour ago     Scoliosis     HAD SURG AT 15Y/O    Scoliosis     UTI (urinary tract infection)     at present       Past Surgical History:   Past Surgical History:   Procedure Laterality Date    HX WISDOM TEETH EXTRACTION      HI SPINE SURGERY PROCEDURE UNLISTED      for scoliosis      SocHx:   Social History     Tobacco Use    Smoking status: Current Every Day Smoker     Types: Cigarettes    Smokeless tobacco: Never Used   Substance Use Topics    Alcohol use: Yes     Comment: socially      FamHx:? No family history on file. Current Medications: Allergies: Allergies   Allergen Reactions    Banana Other (comments)     Stomach ache    Peanut Unknown (comments)    Peanut Butter Flavor Itching and Swelling        Review of Systems:   Gen:    Denied fevers, chills, malaise, fatigue, weight changes   Resp: Denied shortness of breath, cough, wheezing   CVS: Denied chest pain, palpitations   : Denied urinary urgency, frequency, incontinence   GI: Denied nausea, vomiting, constipation, diarrhea   Skin: Denied rashes, wounds   Psych: Denied anxiety, depression   Vasc: Denied claudication, ulcers   Hem: Denied easy bruising/bleeding   MSK: See HPI   Neuro: See HPI            Assessment:   1. Thoracolumbar scoliosis s/p surgery at age 16- T5-L2  2. Lumbar pain below iqra surgery with L4/5 and L5/S1 small disc bulges   3. ? Right Thoracic neuritis- improving      Plan:      -Physical therapy - continue HEP  -Medications - Start gabapentin 300mg qhs and can increase to 300mg TID as tolerated. Reviewed potential side effects including but not limited to: drowsiness, dizziness, feeling in a fog, mood changes, fluid retention.     Counseled regarding side effects and appropriate administration of medications.   -Diagnostics/Imaging -reviewed  CT scan changes - right nerve impingement   -Injections -referral to try (right paracentral) L4-5 Il AMBREEN, if no relief would consider facet injection or potential right SI joint injection  -F.u after AMBREEN        [facility]    I was in the office while conducting this encounter. Patient was not in the office    Consent:  She and/or her healthcare decision maker is aware that this patient-initiated Telehealth encounter is a billable service, with coverage as determined by her insurance carrier. She is aware that she may receive a bill and has provided verbal consent to proceed: Yes    This virtual visit was conducted via CHORD. Pursuant to the emergency declaration under the [facility], [facility]5 waiver authority and the [employer] and [employer] Act, this Virtual  Visit was conducted to reduce the patient's risk of exposure to COVID-19 and provide continuity of care for an established patient. Services were provided through a video synchronous discussion virtually to substitute for in-person clinic visit. Due to this being a TeleHealth evaluation, many elements of the physical examination are unable to be assessed. Total Time: minutes: 11-20 minutes.

## 2021-09-23 ENCOUNTER — HOSPITAL ENCOUNTER (OUTPATIENT)
Age: 26
Setting detail: OUTPATIENT SURGERY
Discharge: HOME | End: 2021-09-23
Attending: PHYSICAL MEDICINE & REHABILITATION | Admitting: PHYSICAL MEDICINE & REHABILITATION
Payer: OTHER GOVERNMENT

## 2021-09-23 ENCOUNTER — APPOINTMENT (OUTPATIENT)
Dept: GENERAL RADIOLOGY | Age: 26
End: 2021-09-23
Attending: PHYSICAL MEDICINE & REHABILITATION
Payer: OTHER GOVERNMENT

## 2021-09-23 VITALS
OXYGEN SATURATION: 100 % | RESPIRATION RATE: 16 BRPM | TEMPERATURE: 98.7 F | DIASTOLIC BLOOD PRESSURE: 80 MMHG | HEART RATE: 73 BPM | SYSTOLIC BLOOD PRESSURE: 114 MMHG

## 2021-09-23 LAB — HCG UR QL: NEGATIVE

## 2021-09-23 PROCEDURE — 2709999900 HC NON-CHARGEABLE SUPPLY: Performed by: PHYSICAL MEDICINE & REHABILITATION

## 2021-09-23 PROCEDURE — 74011250636 HC RX REV CODE- 250/636: Performed by: PHYSICAL MEDICINE & REHABILITATION

## 2021-09-23 PROCEDURE — 77030014124 HC TY EPDRL BBMI -A: Performed by: PHYSICAL MEDICINE & REHABILITATION

## 2021-09-23 PROCEDURE — 74011250637 HC RX REV CODE- 250/637: Performed by: PHYSICAL MEDICINE & REHABILITATION

## 2021-09-23 PROCEDURE — 76010000009 HC PAIN MGT 0 TO 30 MIN PROC: Performed by: PHYSICAL MEDICINE & REHABILITATION

## 2021-09-23 PROCEDURE — 62323 NJX INTERLAMINAR LMBR/SAC: CPT | Performed by: PHYSICAL MEDICINE & REHABILITATION

## 2021-09-23 PROCEDURE — 81025 URINE PREGNANCY TEST: CPT

## 2021-09-23 PROCEDURE — 74011000250 HC RX REV CODE- 250: Performed by: PHYSICAL MEDICINE & REHABILITATION

## 2021-09-23 RX ORDER — DIAZEPAM 5 MG/1
5-20 TABLET ORAL ONCE
Status: COMPLETED | OUTPATIENT
Start: 2021-09-23 | End: 2021-09-23

## 2021-09-23 RX ORDER — DEXAMETHASONE SODIUM PHOSPHATE 100 MG/10ML
INJECTION INTRAMUSCULAR; INTRAVENOUS AS NEEDED
Status: DISCONTINUED | OUTPATIENT
Start: 2021-09-23 | End: 2021-09-23 | Stop reason: HOSPADM

## 2021-09-23 RX ORDER — LIDOCAINE HYDROCHLORIDE 10 MG/ML
INJECTION, SOLUTION EPIDURAL; INFILTRATION; INTRACAUDAL; PERINEURAL AS NEEDED
Status: DISCONTINUED | OUTPATIENT
Start: 2021-09-23 | End: 2021-09-23 | Stop reason: HOSPADM

## 2021-09-23 RX ADMIN — DIAZEPAM 5 MG: 5 TABLET ORAL at 13:30

## 2021-09-23 NOTE — PROCEDURES
Intralaminar Epidural Steroid Block Procedure Note      Patient Name: Sheila Carrion    Date of Procedure: September 23, 2021    Preoperative Diagnosis: Lumbar pain [M54.5]    Post Operative Diagnosis: Lumbar pain [M54.5]    Procedure: L4-L5 Epidural Block     PROCEDURE:  Lumbar Epidural Block    Consent:  Informed  Consent was obtained prior to the procedure. The patient was given the opportunity to ask questions regarding the procedure and its associated risks. In addition to the potential risks associated with the procedure itself, the patient was informed both verbally and in writing of potential side effects of the use glucocorticoids. The patient appeared to comprehend the informed consent and desired to have the procedure performed. The patient was placed in the prone position on the fluoroscopy table and the back prepped and draped in the usual sterile manner. The interlaminar space was identified using fluoroscopy and the skin anesthetized with 1% Lidocaine. A #18 Tuohy Epidural needle was advanced under fluoroscopic control from a paramedian approach to the  epidural space as noted above, using the loss of resistance to fluid technique. Then, 10 mg of preservative free Dexamethasone and 2cc of Lidocaine was slowly injected. The patient tolerated the procedure well. The injection area was cleaned and band aids applied. No excessive bleeding was noted. Patient dressed and was discharged to home with instructions.             2

## 2021-09-23 NOTE — DISCHARGE INSTRUCTIONS
[facility]   (MATILDE)  Dr. Joel Wilder, Dr. Glenis Valerio, Dr. Lorena Carson Spinal Procedure (Block) Instructions    * Do not drive a car, operate heavy machinery or dangerous equipment, or make important decisions for 12-24 hours. * Light activity as tolerated; may rest for the remainder of the day. * Resume pre-block medications including those from your other doctors. * Do not drink alcoholic beverages for 24 hours. Alcohol and the medications you have received may interact and cause an adverse reaction. * You may feel better this evening and worse tomorrow, as the numbing medications wears off and the steroid has yet to begin to work. After 48-72 hrs the steroid should begin to release bringing you relief. If you had a medial branch block, no steroids were used. The medial branch block is a test to see if you are a candidate for radiofrequency ablation (RFA). The anesthetic (numbing medicine)  will wear off by the next day. * You may shower this evening and remove any bandages. * Avoid hot tubs/pools/tub soaks and heating pads for 24 hours. You may use cold packs on the procedure site as tolerated for the first 24 hours. * If a headache develops, drink plenty of fluids and rest.  Take over the counter medications for headache if needed. If the headache continues longer than 24 hours, call MD at the [address] [place] [address] Avenue. [phone]    * Continue taking pain medications as needed. * You may resume your regular diet if tolerated. Otherwise, start with sips of water and advance slowly. * If Diabetic: check your blood sugar three times a day for the next 3 days. If your sugar is greater than 300 call your family doctor. If your sugar is greater than 400, have someone transport you to the nearest Emergency Room. * If you experience any of the following problems, Please Call the [address] [place] [address] Avenue at [phone].         * Excessive pain, swelling, redness or odor at or around the surgical area    * Fever of 101 or higher    * Nausea / Vomiting lasting longer than 4 hours or if unable to take medications. * Severe Headache    * Weakness or numbness in arms or legs that is not      resolving   * Any NEW signs of decreased circulation or nerve impairment in leg: change in color, swelling, persistent numbness, tingling                    * Prolonged increase in pain greater than 4 days      PATIENT INSTRUCTIONS:    After oral sedation, for 12-24 hours or while taking prescription Narcotics:  · Limit your activities  · Do not drive and operate hazardous machinery  · Do not make important personal or business decisions  · Do  not drink alcoholic beverages  · If you have not urinated within 8 hours after discharge, please contact your surgeon on call. *  Please give a list of your current medications to your Primary Care Provider. *  Please update this list whenever your medications are discontinued, doses are      changed, or new medications (including over-the-counter products) are added. *  Please carry medication information at all times in case of emergency situations. These are general instructions for a healthy lifestyle:    No smoking/ No tobacco products/ Avoid exposure to second hand smoke    Surgeon General's Warning:  Quitting smoking now greatly reduces serious risk to your health. Obesity, smoking, and sedentary lifestyle greatly increases your risk for illness    A healthy diet, regular physical exercise & weight monitoring are important for maintaining a healthy lifestyle    You may be retaining fluid if you have a history of heart failure or if you experience any of the following symptoms:  Weight gain of 3 pounds or more overnight or 5 pounds in a week, increased swelling in our hands or feet or shortness of breath while lying flat in bed.   Please call your doctor as soon as you notice any of these symptoms; do not wait until your next office visit. Recognize signs and symptoms of STROKE:    F-face looks uneven    A-arms unable to move or move unevenly    S-speech slurred or non-existent    T-time-call 911 as soon as signs and symptoms begin-DO NOT go       Back to bed or wait to see if you get better-TIME IS BRAIN.

## 2021-10-28 NOTE — PROGRESS NOTES
[facility]  [address] [place] [address], [address]  Tel. [phone]  Fax: [phone]    Physical Therapy Discharge Summary    Patient Name: Alea Haley : 1995   Treatment/Medical Diagnosis: Low back pain [M54.5]   Referral Source: Teresa Dailey     Date of Initial Visit: 21 Attended Visits: 11 Missed Visits: 4       SUMMARY OF TREATMENT  Pt attended only initial evaluation and     10     follow-ups with the last attended visit on 21 and then did not return. Therefore a formal reassessment of goals was not performed. Pt was called on 10/1/21 and requested to be d/c'd.       RECOMMENDATIONS  Discontinue physical therapy due to patient not returning. Pt shall remain under care of MD.      If you have any questions/comments please contact us directly at [phone]. Thank you for allowing us to assist in the care of your patient.     Therapist Signature: Acacia Campos, PT, DPT, CIMT Date: 10/28/21     Time: 10:43 AM

## 2021-11-15 ENCOUNTER — TELEPHONE (OUTPATIENT)
Dept: ORTHOPEDIC SURGERY | Age: 26
End: 2021-11-15

## 2021-11-15 NOTE — TELEPHONE ENCOUNTER
Called patient to get her scheduled for SI Joint injection, Patient is tentatively sched for 11/18/2021,as patient stated that she has not taken any ASP or Nsaids since Friday. I will submit auth request to billie and call patient back once received.  Patient is aware  And knows to contact me if she has any questions

## 2021-11-18 ENCOUNTER — TELEPHONE (OUTPATIENT)
Dept: ORTHOPEDIC SURGERY | Age: 26
End: 2021-11-18

## 2021-11-18 ENCOUNTER — HOSPITAL ENCOUNTER (OUTPATIENT)
Age: 26
Setting detail: OUTPATIENT SURGERY
Discharge: HOME | End: 2021-11-18
Attending: PHYSICAL MEDICINE & REHABILITATION | Admitting: PHYSICAL MEDICINE & REHABILITATION
Payer: OTHER GOVERNMENT

## 2021-11-18 ENCOUNTER — APPOINTMENT (OUTPATIENT)
Dept: GENERAL RADIOLOGY | Age: 26
End: 2021-11-18
Attending: PHYSICAL MEDICINE & REHABILITATION
Payer: OTHER GOVERNMENT

## 2021-11-18 VITALS
OXYGEN SATURATION: 97 % | TEMPERATURE: 98.5 F | SYSTOLIC BLOOD PRESSURE: 110 MMHG | DIASTOLIC BLOOD PRESSURE: 68 MMHG | RESPIRATION RATE: 16 BRPM | HEART RATE: 72 BPM

## 2021-11-18 LAB — HCG UR QL: NEGATIVE

## 2021-11-18 PROCEDURE — 74011250636 HC RX REV CODE- 250/636: Performed by: PHYSICAL MEDICINE & REHABILITATION

## 2021-11-18 PROCEDURE — 74011000250 HC RX REV CODE- 250: Performed by: PHYSICAL MEDICINE & REHABILITATION

## 2021-11-18 PROCEDURE — 27096 INJECT SACROILIAC JOINT: CPT | Performed by: PHYSICAL MEDICINE & REHABILITATION

## 2021-11-18 PROCEDURE — 81025 URINE PREGNANCY TEST: CPT

## 2021-11-18 PROCEDURE — 74011250637 HC RX REV CODE- 250/637: Performed by: PHYSICAL MEDICINE & REHABILITATION

## 2021-11-18 PROCEDURE — 77030039433 HC TY MYLEOGRAM BD -B: Performed by: PHYSICAL MEDICINE & REHABILITATION

## 2021-11-18 PROCEDURE — 76010000009 HC PAIN MGT 0 TO 30 MIN PROC: Performed by: PHYSICAL MEDICINE & REHABILITATION

## 2021-11-18 PROCEDURE — 2709999900 HC NON-CHARGEABLE SUPPLY: Performed by: PHYSICAL MEDICINE & REHABILITATION

## 2021-11-18 PROCEDURE — 77030003676 HC NDL SPN MPRI -A: Performed by: PHYSICAL MEDICINE & REHABILITATION

## 2021-11-18 RX ORDER — DIAZEPAM 5 MG/1
5-20 TABLET ORAL ONCE
Status: COMPLETED | OUTPATIENT
Start: 2021-11-18 | End: 2021-11-18

## 2021-11-18 RX ORDER — LIDOCAINE HYDROCHLORIDE 10 MG/ML
INJECTION, SOLUTION EPIDURAL; INFILTRATION; INTRACAUDAL; PERINEURAL AS NEEDED
Status: DISCONTINUED | OUTPATIENT
Start: 2021-11-18 | End: 2021-11-18 | Stop reason: HOSPADM

## 2021-11-18 RX ORDER — DEXAMETHASONE SODIUM PHOSPHATE 100 MG/10ML
INJECTION INTRAMUSCULAR; INTRAVENOUS AS NEEDED
Status: DISCONTINUED | OUTPATIENT
Start: 2021-11-18 | End: 2021-11-18 | Stop reason: HOSPADM

## 2021-11-18 RX ADMIN — DIAZEPAM 10 MG: 5 TABLET ORAL at 12:27

## 2021-11-18 NOTE — PROCEDURES
Procedure Note    Patient Name: Rosmery Kendrick    Date of Procedure: November 18, 2021    Preoperative Diagnosis: Sacrilitis    Post Operative Diagnosis: same    Procedure: SI Joint Injection right     Consent: Informed consent was obtained prior to the procedure. The patient was given the opportunity to ask questions regarding the procedure and its associated risks. In addition to the potential risks associated with the procedure itself, the patient was informed both verbally and in writing of potential side effects of the use of glucocorticoids. The patient appeared to comprehend the informed consent and desired to have the procedure perfored. Procedure: The patient was placed in the prone position on the flouroscopy table and the back was prepped and draped in the usual sterile manner. A #22 gauge spinal needle was then advanced to lie within the SI joint after local Lidocaine 1% injection. A total of 10 mg of preservative free Dexamethasone and 5 cc of Lidocaine was introduced into the SI joint. The injection area was cleaned and bandaids applied. No excessive bleeding was noted. Patient dressed and was discharged to home with instructions. Discussion: The patient tolerated the procedure well.      Bonny Chadwick MD  November 18, 2021

## 2021-11-18 NOTE — TELEPHONE ENCOUNTER
Returned call.   She is scheduled today and will check back in about 2 weeks  No longer taking gabapentin -was not helpful

## 2021-11-18 NOTE — TELEPHONE ENCOUNTER
So Patient was approved for injection today she is going to 1:15pm, But still would like to talk to you later if you could call her. You ordered Right Si joint inj on her. we tried to get her on for today but insurance has not approved yet. She is now scheduled for 12-2-21 with Dr. Aurelio Nicholson. She is asking for something for pain until she can get this injection. She is having a hard time at work and she can not keep calling out.    Patient would also like to talk to you about next step of her care can you call her at [phone]

## 2021-11-18 NOTE — DISCHARGE INSTRUCTIONS
[facility]   (MATILDE)  Dr. Sukhi Britton, Dr. Talya Tripathi, Dr. Mario Tong Spinal Procedure (Block) Instructions    * Do not drive a car, operate heavy machinery or dangerous equipment, or make important decisions for 12-24 hours. * Light activity as tolerated; may rest for the remainder of the day. * Resume pre-block medications including those from your other doctors. * Do not drink alcoholic beverages for 24 hours. Alcohol and the medications you have received may interact and cause an adverse reaction. * You may feel better this evening and worse tomorrow, as the numbing medications wears off and the steroid has yet to begin to work. After 48-72 hrs the steroid should begin to release bringing you relief. If you had a medial branch block, no steroids were used. The medial branch block is a test to see if you are a candidate for radiofrequency ablation (RFA). The anesthetic (numbing medicine)  will wear off by the next day. * You may shower this evening and remove any bandages. * Avoid hot tubs/pools/tub soaks and heating pads for 24 hours. You may use cold packs on the procedure site as tolerated for the first 24 hours. * If a headache develops, drink plenty of fluids and rest.  Take over the counter medications for headache if needed. If the headache continues longer than 24 hours, call MD at the [address] [place] [address] Avenue. [phone]    * Continue taking pain medications as needed. * You may resume your regular diet if tolerated. Otherwise, start with sips of water and advance slowly. * If Diabetic: check your blood sugar three times a day for the next 3 days. If your sugar is greater than 300 call your family doctor. If your sugar is greater than 400, have someone transport you to the nearest Emergency Room. * If you experience any of the following problems, Please Call the [address] [place] [address] Avenue at [phone].         * Excessive pain, swelling, redness or odor at or around the surgical area    * Fever of 101 or higher    * Nausea / Vomiting lasting longer than 4 hours or if unable to take medications. * Severe Headache    * Weakness or numbness in arms or legs that is not      resolving   * Any NEW signs of decreased circulation or nerve impairment in leg: change in color, swelling, persistent numbness, tingling                    * Prolonged increase in pain greater than 4 days      PATIENT INSTRUCTIONS:    After oral sedation, for 12-24 hours or while taking prescription Narcotics:  · Limit your activities  · Do not drive and operate hazardous machinery  · Do not make important personal or business decisions  · Do  not drink alcoholic beverages  · If you have not urinated within 8 hours after discharge, please contact your surgeon on call. *  Please give a list of your current medications to your Primary Care Provider. *  Please update this list whenever your medications are discontinued, doses are      changed, or new medications (including over-the-counter products) are added. *  Please carry medication information at all times in case of emergency situations. These are general instructions for a healthy lifestyle:    No smoking/ No tobacco products/ Avoid exposure to second hand smoke    Surgeon General's Warning:  Quitting smoking now greatly reduces serious risk to your health. Obesity, smoking, and sedentary lifestyle greatly increases your risk for illness    A healthy diet, regular physical exercise & weight monitoring are important for maintaining a healthy lifestyle    You may be retaining fluid if you have a history of heart failure or if you experience any of the following symptoms:  Weight gain of 3 pounds or more overnight or 5 pounds in a week, increased swelling in our hands or feet or shortness of breath while lying flat in bed.   Please call your doctor as soon as you notice any of these symptoms; do not wait until your next office visit. Recognize signs and symptoms of STROKE:    F-face looks uneven    A-arms unable to move or move unevenly    S-speech slurred or non-existent    T-time-call 911 as soon as signs and symptoms begin-DO NOT go       Back to bed or wait to see if you get better-TIME IS BRAIN.

## 2021-11-19 ENCOUNTER — TELEPHONE (OUTPATIENT)
Dept: ORTHOPEDIC SURGERY | Age: 26
End: 2021-11-19

## 2021-11-19 NOTE — LETTER
11/22/2021 1:05 PM    Ms. Carlo Wiley  [address]    To whom it may concern,    Carlo Wiley is currently under my care. She had to leave work early in order to undergo a procedure on 11/18/2021 and we recommended she not return to work the day of the procedure.             Sincerely,      Jose Maria Zimmerman MD

## 2021-11-19 NOTE — TELEPHONE ENCOUNTER
Patient needs work status letter reflecting her procedure date and full duty status. She works for the [employer] and they are very particular. She is at work today but needs the letter asap. She is signing up for [employer] today so she'll be able to access it herself to print.     Mother on [employer]aa E#[phone]

## 2021-11-19 NOTE — TELEPHONE ENCOUNTER
Called patients mother Gracie Balderas(hospitals)[phone] and I informed her that Dr. Merlinda Mouton is out of the office but she does check her messages. And if she does not get to it today, it will be done Monday. She verbalized understanding.

## 2021-12-07 ENCOUNTER — VIRTUAL VISIT (OUTPATIENT)
Dept: ORTHOPEDIC SURGERY | Age: 26
End: 2021-12-07
Payer: OTHER GOVERNMENT

## 2021-12-07 DIAGNOSIS — M53.3 SACROILIAC PAIN: Primary | ICD-10-CM

## 2021-12-07 PROCEDURE — 99212 OFFICE O/P EST SF 10 MIN: CPT | Performed by: PHYSICAL MEDICINE & REHABILITATION

## 2021-12-07 NOTE — PROGRESS NOTES
[address]  [address], [address]  [place], [address] [place] [address] Street  Phone: [phone]  Fax: [phone]      Patient: Shayy Rodrigues                                                                              MRN: [id number]        YOB: 1995          AGE: 23 y.o. PCP: Marsha Blackman MD  Date:  12/07/21    Reason for Consultation: Back Pain    Video visit    HPI:  Shayy Rodrigues is a 23 y.o. female with relevant PMH of scoliosis dx at age 9, tried bracing and then ultimately had surgery T5-L2 (?[place] iqra surgery) at age 16 in [facility], post surgery was active ran cross country and did gymanstic. With her first pregnancy about 1.5  years ago she had right sided low back pain radiating into her right leg that improved once she delivered. She became pregnant with her second son shortly after and during this pregnancy had severe low back pain and right thoracic/scapular pain. She also noticed numbness in her right scapula. After delivery 3 months ago the numbness improved but she continues to have constant right  low back pain and intermittent right thoracic pain. She has tried physical therapy, temporary relief with massage but no lasting relief. We got a CT scan of her lumbar spine which demonstrates  Intact hardware to L2. Small disc bulge L4/5 with mild left foraminal narrowing and L5-S1 disc bulge, early degenerative changes right SI joint, facet hypertrophy on the right L3/4. She tried an :4-5 Il AMBREEN 9/23/2021 without relief. Pain was unchanged and located in her right low back along her SI joint . 11/18/2021 she tried a right SI joint injection which helped significantly 80-90%. Today she reports no pain and her average pain over the last week has been a 2-3. Neurologic symptoms: No numbness, tingling, weakness, bowel or bladder changes.   No recent falls      Location: The pain is located in the constant low back right  Radiation: The pain does not radiate   Pain Score: Currently: 0/10  At Best: 0/10  At Worst: 3/10   Quality: Pain is of a Achy quality. Aggravating: Pain is exacerbated by bending forward  Alleviating: The pain is alleviated by heat    Prior Treatments:   Physical therapy- 2013, currently in PT   Il AMBREEN L4-5 9/23/2021 no relief   11/18/2021- right SI joint injection 80-90% relief   Previous Medications:  Lidocaine patch  Current Medications: Aleve, motrin, diclofenac  Previous work-up has included:   XR Results (most recent):  Results from Hospital Encounter encounter on 11/18/21    [facility] TECHNOLOGIST SERVICE    Narrative  Fluoroscopy was provided for a bundled exam for documentation purposes. Impression  Please see above. FLUORO TIME: 00.08    AJB    CT lumbar spine   CT scan of her lumbar spine which demonstrates  Intact hardware to L2. Small disc bulge L4/5 with mild left foraminal narrowing and L5-S1 disc bulge, early degenerative changes right SI joint, facet hypertrophy on the right L3/4. Past Medical History:   Past Medical History:   Diagnosis Date    Eczema     Migraine     started one hour ago     Scoliosis     HAD SURG AT 15Y/O    Scoliosis     UTI (urinary tract infection)     at present       Past Surgical History:   Past Surgical History:   Procedure Laterality Date    HX WISDOM TEETH EXTRACTION      TX SPINE SURGERY PROCEDURE UNLISTED      for scoliosis      SocHx:   Social History     Tobacco Use    Smoking status: Current Every Day Smoker     Types: Cigarettes    Smokeless tobacco: Never Used   Substance Use Topics    Alcohol use: Yes     Comment: socially      FamHx:? No family history on file. Current Medications: Allergies:     Allergies   Allergen Reactions    Banana Other (comments)     Stomach ache    Peanut Unknown (comments)    Peanut Butter Flavor Itching and Swelling        Review of Systems:   Gen:    Denied fevers, chills, malaise, fatigue, weight changes Resp: Denied shortness of breath, cough, wheezing   CVS: Denied chest pain, palpitations   : Denied urinary urgency, frequency, incontinence   GI: Denied nausea, vomiting, constipation, diarrhea   Skin: Denied rashes, wounds   Psych: Denied anxiety, depression   Vasc: Denied claudication, ulcers   Hem: Denied easy bruising/bleeding   MSK: See HPI   Neuro: See HPI            Assessment:   1. Thoracolumbar scoliosis s/p surgery at age 16- T5-L2  2. Lumbar pain below iqra surgery with L4/5 and L5/S1 small disc bulges   3. Right SI joint pain       Plan:      -Physical therapy - continue HEP  -Medications NA. Rx for gabapentin sent prior to injection but she decided not to try it due to list of side effects   Counseled regarding side effects and appropriate administration of medications.   -Diagnostics/Imaging CT reviewed   -Injections -NA  -F.u if pain returns         [facility]    I was in the office while conducting this encounter. Patient was not in the office    Consent:  She and/or her healthcare decision maker is aware that this patient-initiated Telehealth encounter is a billable service, with coverage as determined by her insurance carrier. She is aware that she may receive a bill and has provided verbal consent to proceed: Yes    This virtual visit was conducted via [employer]. Pursuant to the emergency declaration under the [facility] waiver authority and the "[employer] and [employer] Act, this Virtual  Visit was conducted to reduce the patient's risk of exposure to COVID-19 and provide continuity of care for an established patient. Services were provided through a video synchronous discussion virtually to substitute for in-person clinic visit. Due to this being a TeleHealth evaluation, many elements of the physical examination are unable to be assessed.      Total Time: minutes: 5-10 minutes.

## 2022-09-21 ENCOUNTER — OFFICE VISIT (OUTPATIENT)
Dept: ORTHOPEDIC SURGERY | Age: 27
End: 2022-09-21
Payer: MEDICAID

## 2022-09-21 VITALS
WEIGHT: 136 LBS | BODY MASS INDEX: 25.03 KG/M2 | OXYGEN SATURATION: 98 % | HEART RATE: 85 BPM | HEIGHT: 62 IN | TEMPERATURE: 98.5 F

## 2022-09-21 DIAGNOSIS — M53.3 SACROILIAC PAIN: Primary | ICD-10-CM

## 2022-09-21 DIAGNOSIS — M41.125 ADOLESCENT IDIOPATHIC SCOLIOSIS OF THORACOLUMBAR REGION: ICD-10-CM

## 2022-09-21 PROCEDURE — 99212 OFFICE O/P EST SF 10 MIN: CPT | Performed by: PHYSICAL MEDICINE & REHABILITATION

## 2022-09-21 RX ORDER — ESCITALOPRAM OXALATE 20 MG/1
1 TABLET ORAL
COMMUNITY
Start: 2022-06-28

## 2022-09-21 RX ORDER — ERGOCALCIFEROL 1.25 MG/1
CAPSULE ORAL
COMMUNITY
Start: 2022-08-02

## 2022-09-21 NOTE — PROGRESS NOTES
Franchesca Perez presents today for   Chief Complaint   Patient presents with    Back Pain     Lower         Is someone accompanying this pt? no    Is the patient using any DME equipment during OV? no      Coordination of Care:  1. Have you been to the ER, urgent care clinic since your last visit? no  Hospitalized since your last visit? no    2. Have you seen or consulted any other health care providers outside of the [address] [place] [address] since your last visit? Yes, OB/GYN Include any pap smears or colon screening.  no

## 2022-09-21 NOTE — PROGRESS NOTES
[address]  [address], [address]  [place], [address] [address]  Phone: [phone]  Fax: [phone]      Patient: Julianne Doll                                                                              MRN: [id number]        YOB: 1995          AGE: 33 y.o. PCP: Isabel Painter,   Date:  09/21/22    Reason for Consultation: Back Pain (Lower/)      HPI:  Julianne Doll is a 33 y.o. female with relevant PMH of scoliosis dx at age 9, tried bracing and then ultimately had surgery T5-L2 (?[place] iqra surgery) at age 16 in [facility], post surgery was active ran cross country and did gymanstic. With her first pregnancy about 1.5  years ago she had right sided low back pain radiating into her right leg that improved once she delivered. She became pregnant with her second son shortly after and during this pregnancy had severe low back pain and right thoracic/scapular pain. She also noticed numbness in her right scapula. After delivery 3 months ago the numbness improved but she continues to have constant right  low back pain and intermittent right thoracic pain. She has tried physical therapy, temporary relief with massage but no lasting relief. We got a CT scan of her lumbar spine which demonstrates  Intact hardware to L2. Small disc bulge L4/5 with mild left foraminal narrowing and L5-S1 disc bulge, early degenerative changes right SI joint, facet hypertrophy on the right L3/4. She tried an l4-5 Il AMBREEN 9/23/2021 without relief. Pain was unchanged and located in her right low back along her SI joint . 11/18/2021 she tried a right SI joint injection which helped significantly 80-90% which lasted over 3 months. Her pain has returned along her right SI Joint. Neurologic symptoms: No numbness, tingling, weakness, bowel or bladder changes.   No recent falls      Location: The pain is located in the constant low back right  Radiation: The pain does not radiate   Pain Score: Currently: 6/10  Quality: Pain is of a Achy quality. Aggravating: Pain is exacerbated by  bending forward  Alleviating: The pain is alleviated by  heat    Prior Treatments:   Physical therapy- 2013, currently in PT   Il AMBREEN L4-5 9/23/2021 no relief   11/18/2021- right SI joint injection 80-90% relief x 3 months   Previous Medications:  Lidocaine patch  Current Medications: Aleve, motrin, diclofenac  Previous work-up has included:       CT lumbar spine   CT scan of her lumbar spine which demonstrates  Intact hardware to L2. Small disc bulge L4/5 with mild left foraminal narrowing and L5-S1 disc bulge, early degenerative changes right SI joint, facet hypertrophy on the right L3/4. Past Medical History:   Past Medical History:   Diagnosis Date    Eczema     Migraine     started one hour ago     Scoliosis     HAD SURG AT 13Y/O    Scoliosis     UTI (urinary tract infection)     at present       Past Surgical History:   Past Surgical History:   Procedure Laterality Date    HX WISDOM TEETH EXTRACTION      TX SPINE SURGERY PROCEDURE UNLISTED      for scoliosis      SocHx:   Social History     Tobacco Use    Smoking status: Every Day     Types: Cigarettes    Smokeless tobacco: Never   Substance Use Topics    Alcohol use: Yes     Comment: socially      FamHx:? No family history on file. Current Medications: Allergies:     Allergies   Allergen Reactions    Banana Other (comments)     Stomach ache    Peanut Unknown (comments)    Peanut Butter Flavor Itching and Swelling        Review of Systems:   Gen:    Denied fevers, chills, malaise, fatigue, weight changes   Resp: Denied shortness of breath, cough, wheezing   CVS: Denied chest pain, palpitations   : Denied urinary urgency, frequency, incontinence   GI: Denied nausea, vomiting, constipation, diarrhea   Skin: Denied rashes, wounds   Psych: Denied anxiety, depression   Vasc: Denied claudication, ulcers   Hem: Denied easy bruising/bleeding MSK: See HPI   Neuro: See HPI       General: ??????? Well nourished and well developed female without any acute distress   Psychiatric: ?  Alert and oriented x 3 with normal mood    HEENT: ???????? Atraumatic   Respiratory:   Breathing non-labored and non dyspneic   CV: ???????????????? Peripheral pulses intact, no peripheral edema   Skin: ????????????? No rashes + scar along thoracic and lumbar spineT4-L3        Neurologic: ?? Sensation: normal and grossly intact thebilateral, upper extremity(s), lower extremity(s)   Strength: 5/5 in the bilateral, upper extremity(s), lower extremity(s)  Reflexes: reveals 2+ symmetric DTRs throughout UE   Gait: normal   Upper tract signs: Sin's negative ? Musculoskeletal: Lumbar Exam      Inspection:   Alignment: Abnormal + right thoracic prominence  Atrophy: None   Single leg stance: Abnormal increased pelvic tilt when standing right leg        Tenderness to Palpation:   Lumbar paraspinals Positive R>L  Lumbar spinous processes Negative  SI Joint:  Positive right+++  Gluteal:Negative   Greater trochanter: Negative   Tenderness along the right thoracic paraspinals and medial scapula,     ROM:   Lumbar ROM: Abnormal decreased lumbar motion with flexion-pain  Lumbar facet loading: Positive  Hip ROM: No reproduction of pain with movement      Special Tests        Slump test: Negative  SLR: Negative  CAMILLA: + right  FADIR: Negative    + right SI compression  + thigh thrust right            Assessment:   1. Thoracolumbar scoliosis s/p surgery at age 16- T5-L2  2. Lumbar pain below iqra surgery with L4/5 and L5/S1 small disc bulges   3. Right SI joint pain       Plan:      -Physical therapy - continue HEP  -Medications NA.  Rx for gabapentin sent prior to injection but she decided not to try it due to list of side effects   Counseled regarding side effects and appropriate administration of medications.   -Diagnostics/Imaging CT reviewed   -Injections -Referral to repeat right SI joint injection.   If pain relief is not sustained but injection helps would consider referral for RFA   -F.u after injection         [facility]    I

## 2022-09-29 ENCOUNTER — APPOINTMENT (OUTPATIENT)
Dept: GENERAL RADIOLOGY | Age: 27
End: 2022-09-29
Attending: PHYSICAL MEDICINE & REHABILITATION
Payer: MEDICAID

## 2022-09-29 ENCOUNTER — HOSPITAL ENCOUNTER (OUTPATIENT)
Age: 27
Setting detail: OUTPATIENT SURGERY
Discharge: HOME | End: 2022-09-29
Attending: PHYSICAL MEDICINE & REHABILITATION | Admitting: PHYSICAL MEDICINE & REHABILITATION
Payer: MEDICAID

## 2022-09-29 VITALS
TEMPERATURE: 98.5 F | OXYGEN SATURATION: 97 % | SYSTOLIC BLOOD PRESSURE: 105 MMHG | HEART RATE: 77 BPM | RESPIRATION RATE: 14 BRPM | DIASTOLIC BLOOD PRESSURE: 68 MMHG

## 2022-09-29 LAB — HCG UR QL: NEGATIVE

## 2022-09-29 PROCEDURE — 2709999900 HC NON-CHARGEABLE SUPPLY: Performed by: PHYSICAL MEDICINE & REHABILITATION

## 2022-09-29 PROCEDURE — 81025 URINE PREGNANCY TEST: CPT

## 2022-09-29 PROCEDURE — 74011000250 HC RX REV CODE- 250: Performed by: PHYSICAL MEDICINE & REHABILITATION

## 2022-09-29 PROCEDURE — 27096 INJECT SACROILIAC JOINT: CPT | Performed by: PHYSICAL MEDICINE & REHABILITATION

## 2022-09-29 PROCEDURE — 76010000009 HC PAIN MGT 0 TO 30 MIN PROC: Performed by: PHYSICAL MEDICINE & REHABILITATION

## 2022-09-29 PROCEDURE — 74011250636 HC RX REV CODE- 250/636: Performed by: PHYSICAL MEDICINE & REHABILITATION

## 2022-09-29 PROCEDURE — 77030003676 HC NDL SPN MPRI -A: Performed by: PHYSICAL MEDICINE & REHABILITATION

## 2022-09-29 PROCEDURE — 77030039433 HC TY MYLEOGRAM BD -B: Performed by: PHYSICAL MEDICINE & REHABILITATION

## 2022-09-29 PROCEDURE — 74011250637 HC RX REV CODE- 250/637: Performed by: PHYSICAL MEDICINE & REHABILITATION

## 2022-09-29 RX ORDER — DIAZEPAM 5 MG/1
5-20 TABLET ORAL ONCE
Status: COMPLETED | OUTPATIENT
Start: 2022-09-29 | End: 2022-09-29

## 2022-09-29 RX ORDER — LIDOCAINE HYDROCHLORIDE 10 MG/ML
INJECTION, SOLUTION EPIDURAL; INFILTRATION; INTRACAUDAL; PERINEURAL AS NEEDED
Status: DISCONTINUED | OUTPATIENT
Start: 2022-09-29 | End: 2022-09-29 | Stop reason: HOSPADM

## 2022-09-29 RX ORDER — DEXAMETHASONE SODIUM PHOSPHATE 100 MG/10ML
INJECTION INTRAMUSCULAR; INTRAVENOUS AS NEEDED
Status: DISCONTINUED | OUTPATIENT
Start: 2022-09-29 | End: 2022-09-29 | Stop reason: HOSPADM

## 2022-09-29 RX ADMIN — DIAZEPAM 10 MG: 5 TABLET ORAL at 13:08

## 2022-09-29 NOTE — PROCEDURES
Procedure Note    Patient Name: Alejo Garcia    Date of Procedure: September 29, 2022    Preoperative Diagnosis: Sacrilitis    Post Operative Diagnosis: same    Procedure: SI Joint Injection right     Consent: Informed consent was obtained prior to the procedure. The patient was given the opportunity to ask questions regarding the procedure and its associated risks. In addition to the potential risks associated with the procedure itself, the patient was informed both verbally and in writing of potential side effects of the use of glucocorticoids. The patient appeared to comprehend the informed consent and desired to have the procedure perfored. Procedure: The patient was placed in the prone position on the flouroscopy table and the back was prepped and draped in the usual sterile manner. A #22 gauge spinal needle was then advanced to lie within the SI joint after local Lidocaine 1% injection. A total of 10 mg of preservative free Dexamethasone and 5 cc of Lidocaine was introduced into the SI joint. The injection area was cleaned and bandaids applied. No excessive bleeding was noted. Patient dressed and was discharged to home with instructions. Discussion: The patient tolerated the procedure well.      Juan Miguel Hansen MD  September 29, 2022

## 2022-09-29 NOTE — PERIOP NOTES
Patient verbalized understanding of discharge instructions and verbally consented to [facility] Patient Consent. Signature pad not working.

## 2022-10-03 ENCOUNTER — OFFICE VISIT (OUTPATIENT)
Dept: ORTHOPEDIC SURGERY | Age: 27
End: 2022-10-03
Payer: MEDICAID

## 2022-10-03 VITALS
TEMPERATURE: 98.1 F | HEIGHT: 62 IN | WEIGHT: 135 LBS | OXYGEN SATURATION: 98 % | HEART RATE: 75 BPM | BODY MASS INDEX: 24.84 KG/M2

## 2022-10-03 DIAGNOSIS — M41.125 ADOLESCENT IDIOPATHIC SCOLIOSIS OF THORACOLUMBAR REGION: ICD-10-CM

## 2022-10-03 DIAGNOSIS — M53.3 SACROILIAC PAIN: Primary | ICD-10-CM

## 2022-10-03 DIAGNOSIS — G89.29 CHRONIC BILATERAL LOW BACK PAIN WITHOUT SCIATICA: ICD-10-CM

## 2022-10-03 DIAGNOSIS — M54.50 CHRONIC BILATERAL LOW BACK PAIN WITHOUT SCIATICA: ICD-10-CM

## 2022-10-03 PROCEDURE — 99214 OFFICE O/P EST MOD 30 MIN: CPT | Performed by: PHYSICAL MEDICINE & REHABILITATION

## 2022-10-03 RX ORDER — CYCLOBENZAPRINE HCL 5 MG
5 TABLET ORAL
Qty: 30 TABLET | Refills: 0 | Status: SHIPPED | OUTPATIENT
Start: 2022-10-03 | End: 2022-11-02

## 2022-10-03 NOTE — PROGRESS NOTES
[address]  [address], [address]  [place]  Phone: [phone]  Fax: [phone]      Patient: Luther Alfred                                                                              MRN: [id number]        YOB: 1995          AGE: 33 y.o. PCP: Tammi Floyd,   Date:  10/03/22    Reason for Consultation: Back Pain (Lower/)      HPI:  Luther Alfred is a 33 y.o. female with relevant PMH of scoliosis dx at age 9, tried bracing and then ultimately had surgery T5-L2 (?[place] iqra surgery) at age 16 in [facility], post surgery was active ran cross country and did gymanstic. With her first pregnancy about 1.5  years ago she had right sided low back pain radiating into her right leg that improved once she delivered. She became pregnant with her second son shortly after and during this pregnancy had severe low back pain and right thoracic/scapular pain. She also noticed numbness in her right scapula. After delivery 3 months ago the numbness improved but she continues to have constant right  low back pain and intermittent right thoracic pain. She has tried physical therapy, temporary relief with massage but no lasting relief. We got a CT scan of her lumbar spine which demonstrates  Intact hardware to L2. Small disc bulge L4/5 with mild left foraminal narrowing and L5-S1 disc bulge, early degenerative changes right SI joint, facet hypertrophy on the right L3/4. She tried an l4-5 Il AMBREEN 9/23/2021 without relief. Pain was unchanged and located in her right low back along her SI joint . 11/18/2021 she tried a right SI joint injection which helped significantly 80-90% which lasted over 3 months.   Her pain has returned along her right SI Joint and she recently had another right SI joint injection 9/29/22, the injection was painful and now she feels muscle stiffness around her low back       Neurologic symptoms: No numbness, tingling, weakness, bowel or bladder changes. No recent falls      Location: The pain is located in the constant low back right  Radiation: The pain does not radiate   Pain Score: Currently: 3/10  Quality: Pain is of a Achy quality. Aggravating: Pain is exacerbated by  bending forward  Alleviating: The pain is alleviated by  heat    Prior Treatments:   Physical therapy- 2013, currently in PT   Il AMBREEN L4-5 9/23/2021 no relief   11/18/2021- right SI joint injection 80-90% relief x 3 months   9/29/22- right SI joint injection- painful- may need sedation if repeated   Previous Medications:  Lidocaine patch, gabapentin prescribed in the past but she was hesitant to try  Current Medications: Aleve, motrin, diclofenac  Previous work-up has included:       CT lumbar spine   CT scan of her lumbar spine which demonstrates  Intact hardware to L2. Small disc bulge L4/5 with mild left foraminal narrowing and L5-S1 disc bulge, early degenerative changes right SI joint, facet hypertrophy on the right L3/4. Past Medical History:   Past Medical History:   Diagnosis Date    Eczema     Migraine     started one hour ago     Scoliosis     HAD SURG AT 13Y/O    Scoliosis     UTI (urinary tract infection)     at present       Past Surgical History:   Past Surgical History:   Procedure Laterality Date    HX WISDOM TEETH EXTRACTION      [facility] SPINE SURGERY PROCEDURE UNLISTED      for scoliosis      SocHx:   Social History     Tobacco Use    Smoking status: Every Day     Types: Cigarettes    Smokeless tobacco: Never   Substance Use Topics    Alcohol use: Yes     Comment: socially      FamHx:? No family history on file. Current Medications: Allergies:     Allergies   Allergen Reactions    Banana Other (comments)     Stomach ache    Peanut Unknown (comments)    Peanut Butter Flavor Itching and Swelling        Review of Systems:   Gen:    Denied fevers, chills, malaise, fatigue, weight changes   Resp: Denied shortness of breath, cough, wheezing CVS: Denied chest pain, palpitations   : Denied urinary urgency, frequency, incontinence   GI: Denied nausea, vomiting, constipation, diarrhea   Skin: Denied rashes, wounds   Psych: Denied anxiety, depression   Vasc: Denied claudication, ulcers   Hem: Denied easy bruising/bleeding   MSK: See HPI   Neuro: See HPI       General: ??????? Well nourished and well developed female without any acute distress   Psychiatric: ?  Alert and oriented x 3 with normal mood    HEENT: ???????? Atraumatic   Respiratory:   Breathing non-labored and non dyspneic   CV: ???????????????? Peripheral pulses intact, no peripheral edema   Skin: ????????????? No rashes + scar along thoracic and lumbar spineT4-L3        Neurologic: ?? Sensation: normal and grossly intact thebilateral, upper extremity(s), lower extremity(s)   Strength: 5/5 in the bilateral, upper extremity(s), lower extremity(s)  Reflexes: reveals 2+ symmetric DTRs throughout UE   Gait: normal   Upper tract signs: Sin's negative ? Musculoskeletal: Lumbar Exam      Inspection:   Alignment: Abnormal + right thoracic prominence  Atrophy: None   Single leg stance: Abnormal increased pelvic tilt when standing right leg        Tenderness to Palpation:   Lumbar paraspinals Positive R>L  Lumbar spinous processes Negative  SI Joint:  Positive right+++  Gluteal:Negative   Greater trochanter: Negative   Tenderness along the right thoracic paraspinals and medial scapula,     ROM:   Lumbar ROM: Abnormal decreased lumbar motion with flexion-pain  Lumbar facet loading: Positive  Hip ROM: No reproduction of pain with movement      Special Tests        Slump test: Negative  SLR: Negative  CAMILLA: + right  FADIR: Negative    + right SI compression  + thigh thrust right            Assessment:   1. Thoracolumbar scoliosis s/p surgery at age 16- T5-L2  2. Lumbar pain below iqra surgery with L4/5 and L5/S1 small disc bulges   3.  Right SI joint pain       Plan:      -Physical therapy - continue HEP  -Medications Rx for flexeril to take for muscle spasm Rx for gabapentin sent prior to injection but she decided not to try it due to list of side effects   Counseled regarding side effects and appropriate administration of medications.   -Diagnostics/Imaging CT reviewed   -Injections -Referral to repeat right SI joint injection.   If pain relief is not sustained but injection helps would consider referral for RFA vs discussed for SI joint fusion   -F.u  in 2 weeks         [address] [facility]    I

## 2022-10-03 NOTE — PROGRESS NOTES
Len Michelle presents today for   Chief Complaint   Patient presents with    Back Pain     Lower         Is someone accompanying this pt? no    Is the patient using any DME equipment during OV? no      Coordination of Care:  1. Have you been to the ER, urgent care clinic since your last visit? no  Hospitalized since your last visit? no    2. Have you seen or consulted any other health care providers outside of the [address] [place] [address] since your last visit? no Include any pap smears or colon screening.  no

## 2022-10-18 ENCOUNTER — OFFICE VISIT (OUTPATIENT)
Dept: ORTHOPEDIC SURGERY | Age: 27
End: 2022-10-18
Payer: MEDICAID

## 2022-10-18 VITALS
HEIGHT: 62 IN | HEART RATE: 85 BPM | BODY MASS INDEX: 25.21 KG/M2 | WEIGHT: 137 LBS | OXYGEN SATURATION: 97 % | TEMPERATURE: 97.6 F | RESPIRATION RATE: 18 BRPM

## 2022-10-18 DIAGNOSIS — M41.125 ADOLESCENT IDIOPATHIC SCOLIOSIS OF THORACOLUMBAR REGION: ICD-10-CM

## 2022-10-18 DIAGNOSIS — M54.50 CHRONIC BILATERAL LOW BACK PAIN WITHOUT SCIATICA: ICD-10-CM

## 2022-10-18 DIAGNOSIS — G89.29 CHRONIC BILATERAL LOW BACK PAIN WITHOUT SCIATICA: ICD-10-CM

## 2022-10-18 DIAGNOSIS — M53.3 SACROILIAC PAIN: Primary | ICD-10-CM

## 2022-10-18 PROCEDURE — 99212 OFFICE O/P EST SF 10 MIN: CPT | Performed by: PHYSICAL MEDICINE & REHABILITATION

## 2022-10-18 NOTE — PROGRESS NOTES
[address]  [address], [address]  [facility], [address]  Phone: [phone]  Fax: [phone]      Patient: Mikel Murphy                                                                              MRN: [id number]        YOB: 1995          AGE: 33 y.o. PCP: Yahaira Cruz DO  Date:  10/18/22    Reason for Consultation: Back Pain      HPI:  Mikel Murphy is a 33 y.o. female with relevant PMH of scoliosis dx at age 9, tried bracing and then ultimately had surgery T5-L2 (?[place] iqra surgery) at age 16 in [facility], post surgery was active ran cross country and did gymanstic. With her first pregnancy she had right sided low back pain radiating into her right leg that improved once she delivered. She became pregnant with her second son shortly after and during this pregnancy had severe low back pain and right thoracic/scapular pain. She also noticed numbness in her right scapula. After delivery 3 months ago the numbness improved but she continues to have constant right  low back pain and intermittent right thoracic pain. She has tried physical therapy, temporary relief with massage but no lasting relief. We got a CT scan of her lumbar spine which demonstrates  Intact hardware to L2. Small disc bulge L4/5 with mild left foraminal narrowing and L5-S1 disc bulge, early degenerative changes right SI joint, facet hypertrophy on the right L3/4. She tried an l4-5 Il AMBREEN 9/23/2021 without relief. Pain was unchanged and located in her right low back along her SI joint . 11/18/2021 she tried a right SI joint injection which helped significantly 80-90% which lasted over 3 months. Her pain has returned along her right SI Joint and she recently had another right SI joint injection 9/29/22, which only gave about 2 days of relief. Neurologic symptoms: No numbness, tingling, weakness, bowel or bladder changes. No recent falls      Location:  The pain is located in the constant low back right  Radiation: The pain does not radiate   Pain Score: Currently: 3/10  Quality: Pain is of a Achy quality. Aggravating: Pain is exacerbated by  bending forward  Alleviating: The pain is alleviated by  heat    Prior Treatments:   Physical therapy- 2013, currently in PT   Il AMBREEN L4-5 9/23/2021 no relief   11/18/2021- right SI joint injection 80-90% relief x 3 months   9/29/22- right SI joint injection- painful- may need sedation if repeated   Previous Medications:  Lidocaine patch, gabapentin prescribed in the past but she was hesitant to try  Current Medications: Aleve, motrin, diclofenac  Previous work-up has included:       CT lumbar spine   CT scan of her lumbar spine which demonstrates  Intact hardware to L2. Small disc bulge L4/5 with mild left foraminal narrowing and L5-S1 disc bulge, early degenerative changes right SI joint, facet hypertrophy on the right L3/4. Past Medical History:   Past Medical History:   Diagnosis Date    Eczema     Migraine     started one hour ago     Scoliosis     HAD SURG AT 13Y/O    Scoliosis     UTI (urinary tract infection)     at present       Past Surgical History:   Past Surgical History:   Procedure Laterality Date    HX WISDOM TEETH EXTRACTION      [facility] SPINE SURGERY PROCEDURE UNLISTED      for scoliosis      SocHx:   Social History     Tobacco Use    Smoking status: Every Day     Types: Cigarettes    Smokeless tobacco: Never   Substance Use Topics    Alcohol use: Yes     Comment: socially      FamHx:? No family history on file. Current Medications: Allergies:     Allergies   Allergen Reactions    Banana Other (comments)     Stomach ache    Peanut Unknown (comments)    Peanut Butter Flavor Itching and Swelling        Review of Systems:   Gen:    Denied fevers, chills, malaise, fatigue, weight changes   Resp: Denied shortness of breath, cough, wheezing   CVS: Denied chest pain, palpitations   : Denied urinary urgency, frequency, incontinence   GI: Denied nausea, vomiting, constipation, diarrhea   Skin: Denied rashes, wounds   Psych: Denied anxiety, depression   Vasc: Denied claudication, ulcers   Hem: Denied easy bruising/bleeding   MSK: See HPI   Neuro: See HPI       General: ??????? Well nourished and well developed female without any acute distress   Psychiatric: ?  Alert and oriented x 3 with normal mood    HEENT: ???????? Atraumatic   Respiratory:   Breathing non-labored and non dyspneic   CV: ???????????????? Peripheral pulses intact, no peripheral edema   Skin: ????????????? No rashes + scar along thoracic and lumbar spineT4-L3        Neurologic: ?? Sensation: normal and grossly intact thebilateral, upper extremity(s), lower extremity(s)   Strength: 5/5 in the bilateral, upper extremity(s), lower extremity(s)  Reflexes: reveals 2+ symmetric DTRs throughout UE   Gait: normal   Upper tract signs: Sin's negative ? Musculoskeletal: Lumbar Exam      Inspection:   Alignment: Abnormal + right thoracic prominence  Atrophy: None   Single leg stance: Abnormal increased pelvic tilt when standing right leg        Tenderness to Palpation:   Lumbar paraspinals Positive R>L  Lumbar spinous processes Negative  SI Joint:  Positive right+++  Gluteal:Negative   Greater trochanter: Negative   Tenderness along the right thoracic paraspinals and medial scapula,     ROM:   Lumbar ROM: Abnormal decreased lumbar motion with flexion-pain  Lumbar facet loading: Positive  Hip ROM: No reproduction of pain with movement      Special Tests        Slump test: Negative  SLR: Negative  CAMILLA: + right  FADIR: Negative    + right SI compression  + thigh thrust right            Assessment:   1. Thoracolumbar scoliosis s/p surgery at age 16- T5-L2  2. Lumbar pain below iqra surgery with L4/5 and L5/S1 small disc bulges   3.  Right SI joint pain       Plan:    -referral to spine surgery to discuss possible SI joint fusion  -Physical therapy - continue HEP  -Medications Rx for flexeril to take for muscle spasm  Counseled regarding side effects and appropriate administration of medications.   -Diagnostics/Imaging NA  -F.u after surgery consult         [address] [facility]    I

## 2022-10-18 NOTE — PROGRESS NOTES
Deyanira Mares presents today for   Chief Complaint   Patient presents with    Back Pain       Is someone accompanying this pt? no    Is the patient using any DME equipment during OV? no    Depression Screening:  No flowsheet data found. Learning Assessment:  No flowsheet data found. Abuse Screening:  No flowsheet data found. Fall Risk  No flowsheet data found. OPIOID RISK TOOL  No flowsheet data found. Coordination of Care:  1. Have you been to the ER, urgent care clinic since your last visit? no  Hospitalized since your last visit? no    2. Have you seen or consulted any other health care providers outside of the [address] [place] [address] since your last visit? no Include any pap smears or colon screening.  no

## (undated) DEVICE — SET EPI 18GA L3.5IN TUOHY NDL W/ 20GA CLS TIP NYL CATH

## (undated) DEVICE — BANDAGE ADH W0.75XL3IN UNIV WVN FAB NAT GEN USE STRP N ADH

## (undated) DEVICE — SYR 10ML LUER LOK 1/5ML GRAD --

## (undated) DEVICE — TRAY MYEL SFTY +

## (undated) DEVICE — NDL SPNE MANAN 22GX6IN --

## (undated) DEVICE — MEDIA CONTRAST 10ML 200MG/ML 41%

## (undated) DEVICE — Device: Brand: MEDEX